# Patient Record
Sex: FEMALE | Race: WHITE | NOT HISPANIC OR LATINO | Employment: UNEMPLOYED | URBAN - METROPOLITAN AREA
[De-identification: names, ages, dates, MRNs, and addresses within clinical notes are randomized per-mention and may not be internally consistent; named-entity substitution may affect disease eponyms.]

---

## 2022-07-30 ENCOUNTER — TRANSFERRED RECORDS (OUTPATIENT)
Dept: MULTI SPECIALTY CLINIC | Facility: CLINIC | Age: 57
End: 2022-07-30

## 2024-01-17 ENCOUNTER — NURSE TRIAGE (OUTPATIENT)
Dept: NURSING | Facility: CLINIC | Age: 59
End: 2024-01-17
Payer: COMMERCIAL

## 2024-01-17 NOTE — TELEPHONE ENCOUNTER
Nurse Triage SBAR    Is this a 2nd Level Triage? NO    Situation: Frostbite concerns    Background: Pt reports she just moved back to the area and doesn't yet have a PCP established. Reports she was outside today with sock and boots on but she has concern she may have frostbite on a couple of her toes.     Assessment: Pt reports toes to feel hot and swollen. Reports they're pink/red in color and a little tender to the touch.    Protocol Recommended Disposition:   Home Care    Recommendation: Home care. Protocol and care advice reviewed. Advised to call back with any new or worsening signs, symptoms, concerns, or questions. They verbalized understanding and agreed to follow advice given.     Reason for Disposition   Mild frostbite or frostnip symptoms    Additional Information   Negative: Unconscious   Negative: Slurred speech   Negative: Confused thinking   Negative: Stumbling or falling   Negative: Sounds like a life-threatening emergency to the triager   Negative: Body temperature < 95 F (35 C) rectally or < 94 F (34.4 C) orally   Negative: Severe shivering that persists > 10 minutes after re-warming and drying   Negative: Before rewarming and skin is white, hard, completely numb (like a block of wood)   Negative: After hour of rewarming and skin looks blue-gray (cyanotic)   Negative: Frostbite blisters contain bloody fluid   Negative: Sounds like a serious injury to the triager   Negative: Looks infected (e.g., spreading redness, red streak, pus)   Negative: Unusually severe cold exposure and any other symptoms   Negative: Frostbite blisters contain clear fluid   Negative: Frostbite and no prior tetanus shots (or is not fully vaccinated)   Negative: Frostbite and HIV positive or severe immunodeficiency (severely weak immune system)   Negative: Patient wants to be seen   Negative: Frostbite and last tetanus shot > 5 years ago    Protocols used: Frostbite-A-[place]    Kirti Barraza RN on 1/17/2024 at 4:57 PM

## 2024-01-29 ENCOUNTER — OFFICE VISIT (OUTPATIENT)
Dept: FAMILY MEDICINE | Facility: CLINIC | Age: 59
End: 2024-01-29
Payer: COMMERCIAL

## 2024-01-29 ENCOUNTER — ORDERS ONLY (AUTO-RELEASED) (OUTPATIENT)
Dept: FAMILY MEDICINE | Facility: CLINIC | Age: 59
End: 2024-01-29

## 2024-01-29 VITALS
DIASTOLIC BLOOD PRESSURE: 77 MMHG | SYSTOLIC BLOOD PRESSURE: 116 MMHG | HEIGHT: 62 IN | BODY MASS INDEX: 21.98 KG/M2 | TEMPERATURE: 99.1 F | RESPIRATION RATE: 16 BRPM | OXYGEN SATURATION: 99 % | HEART RATE: 79 BPM | WEIGHT: 119.44 LBS

## 2024-01-29 DIAGNOSIS — Z12.31 ENCOUNTER FOR SCREENING MAMMOGRAM FOR BREAST CANCER: ICD-10-CM

## 2024-01-29 DIAGNOSIS — Z13.228 SCREENING FOR METABOLIC DISORDER: ICD-10-CM

## 2024-01-29 DIAGNOSIS — Z78.0 POST-MENOPAUSAL: ICD-10-CM

## 2024-01-29 DIAGNOSIS — Z13.220 SCREENING FOR LIPID DISORDERS: ICD-10-CM

## 2024-01-29 DIAGNOSIS — Z13.0 SCREENING FOR DEFICIENCY ANEMIA: ICD-10-CM

## 2024-01-29 DIAGNOSIS — L40.9 PSORIASIS: ICD-10-CM

## 2024-01-29 DIAGNOSIS — Z85.828 HISTORY OF BASAL CELL CANCER: ICD-10-CM

## 2024-01-29 DIAGNOSIS — F43.23 ADJUSTMENT DISORDER WITH MIXED ANXIETY AND DEPRESSED MOOD: ICD-10-CM

## 2024-01-29 DIAGNOSIS — Z12.11 SCREEN FOR COLON CANCER: ICD-10-CM

## 2024-01-29 DIAGNOSIS — F10.20 ALCOHOLISM (H): ICD-10-CM

## 2024-01-29 DIAGNOSIS — F17.200 TOBACCO USE DISORDER: ICD-10-CM

## 2024-01-29 DIAGNOSIS — G44.229 CHRONIC TENSION-TYPE HEADACHE, NOT INTRACTABLE: ICD-10-CM

## 2024-01-29 DIAGNOSIS — Z00.00 ROUTINE GENERAL MEDICAL EXAMINATION AT A HEALTH CARE FACILITY: Primary | ICD-10-CM

## 2024-01-29 LAB
ERYTHROCYTE [DISTWIDTH] IN BLOOD BY AUTOMATED COUNT: 13.3 % (ref 10–15)
HCT VFR BLD AUTO: 41.1 % (ref 35–47)
HGB BLD-MCNC: 13.7 G/DL (ref 11.7–15.7)
MCH RBC QN AUTO: 31.4 PG (ref 26.5–33)
MCHC RBC AUTO-ENTMCNC: 33.3 G/DL (ref 31.5–36.5)
MCV RBC AUTO: 94 FL (ref 78–100)
PLATELET # BLD AUTO: 304 10E3/UL (ref 150–450)
RBC # BLD AUTO: 4.37 10E6/UL (ref 3.8–5.2)
WBC # BLD AUTO: 7.3 10E3/UL (ref 4–11)

## 2024-01-29 PROCEDURE — 85027 COMPLETE CBC AUTOMATED: CPT

## 2024-01-29 PROCEDURE — 36415 COLL VENOUS BLD VENIPUNCTURE: CPT

## 2024-01-29 PROCEDURE — 80053 COMPREHEN METABOLIC PANEL: CPT

## 2024-01-29 PROCEDURE — 99386 PREV VISIT NEW AGE 40-64: CPT

## 2024-01-29 PROCEDURE — 99214 OFFICE O/P EST MOD 30 MIN: CPT | Mod: 25

## 2024-01-29 PROCEDURE — 80061 LIPID PANEL: CPT

## 2024-01-29 RX ORDER — PNV NO.95/FERROUS FUM/FOLIC AC 28MG-0.8MG
500 TABLET ORAL PRN
COMMUNITY

## 2024-01-29 RX ORDER — GABAPENTIN 100 MG/1
100 CAPSULE ORAL 3 TIMES DAILY
Qty: 270 CAPSULE | Refills: 3 | Status: SHIPPED | OUTPATIENT
Start: 2024-01-29 | End: 2024-04-01

## 2024-01-29 RX ORDER — OMEGA-3 FATTY ACIDS/FISH OIL 300-1000MG
400 CAPSULE ORAL EVERY 8 HOURS PRN
COMMUNITY

## 2024-01-29 RX ORDER — GABAPENTIN 100 MG/1
100 CAPSULE ORAL
COMMUNITY
Start: 2022-07-13 | End: 2024-01-29

## 2024-01-29 ASSESSMENT — ENCOUNTER SYMPTOMS
BREAST MASS: 0
PALPITATIONS: 0
NERVOUS/ANXIOUS: 1
CONSTIPATION: 0
HEADACHES: 1
JOINT SWELLING: 0
HEARTBURN: 0
HEMATOCHEZIA: 0
FREQUENCY: 0
NAUSEA: 0
WEAKNESS: 0
CHILLS: 0
EYE PAIN: 0
ABDOMINAL PAIN: 0
COUGH: 1
DYSURIA: 0
PARESTHESIAS: 0
SORE THROAT: 0
FEVER: 0
DIARRHEA: 0
ARTHRALGIAS: 0
DIZZINESS: 0
SHORTNESS OF BREATH: 0
HEMATURIA: 0
MYALGIAS: 1

## 2024-01-29 NOTE — ASSESSMENT & PLAN NOTE
Primarily triggered by stress. She reports that overall her symptoms are well controlled. She has used a steroid ointment in the past with good success.  Denies additional needs today; we discussed the use of emollient therapy with bursts of topical steroids only if needed.

## 2024-01-29 NOTE — ASSESSMENT & PLAN NOTE
Patient reports longstanding issues with alcoholism.  She notes a period of approximately 17 years of sobriety, but notes that the recent years have been more problematic.  She is currently sober has been for 9 months.  Congratulated her on this effort today.  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  She notes that the last year was quite intensive and her treatment and management of sobriety, including talk therapy.  She has participated medication assisted in abstinence historically but currently is not on any medications.  Again, congratulated her on her efforts and encouraged her to reach out with any additional needs.

## 2024-01-29 NOTE — ASSESSMENT & PLAN NOTE
Annual exam.  New patient.  Records reviewed today.  Pap: Appears patient is due.  I can see an HPV only with no Pap from 2020.  She declines this today.  Mammogram: Ordered.  Colon cancer screening: Patient reports that she has had a colonoscopy which was normal, and since then has elected for Cologuard.  She thinks it has been at least 3 years since her last Cologuard so order was placed today.  Immunizations: Interested in RSV, but will check with insurance.  Future ordered today.  Labs: Discussed and offered.  Mood: As documented.  BMI: 22.06.  Discussed diet and exercise.  Discussed bone health.  She is a chronic tobacco user, so early DEXA screening was ordered as we discussed this puts her at higher risk  Follow-up in 1 year for annual exam or sooner if needed/indicated.

## 2024-01-29 NOTE — COMMUNITY RESOURCES LIST (ENGLISH)
01/29/2024   [facility]  N/A  For questions about this resource list or additional care needs, please contact your primary care clinic or care manager.  Phone: [phone]   Email: N/A   Address: [address] [place] [id number]   Hours: N/A        Financial Stability       Rent and mortgage payment assistance  1  [facility] Distance: 4.46 miles      [address] [place] [address]  Language: English, Irish  Hours: Mon 9:30 AM - 11:30 AM , Tue 1:30 PM - 7:30 PM , Thu 9:00 AM - 7:00 PM , Fri 9:30 AM - 11:30 AM   Phone: (632) 595-3697 Email: info@[employer] Website: http://[employer].org/     2  [facility] - Homeless Outreach Services Team - Emergency Rental Assistance Distance: 8.54 miles      In-Person, Phone/[address] [address]  Language: English  Hours: Mon - Thu 8:00 AM - 4:00 PM  Fees: Free   Phone: (199) 713-8493 Email: office@[facility].[facility] Website: https://www.[facility].[facility]/community-resource-center/          Food and Nutrition       Food pantry  3  [facility] Distance: 3.17 miles      In-Person, Pickup   [address] [place] [address]  Language: English  Hours: Mon - Thu 9:00 AM - 10:00 AM  Fees: Free   Phone: (154) 725-5547 Email: communication@[facility].org Website: http://www.[facility].org     4  [facility] - Food Shelf Distance: 4.46 miles      Pickup   [address] [place] [address]  Language: English, Irish  Hours: Mon 9:30 AM - 11:30 AM , Wed 9:30 AM - 11:30 AM , Thu 1:30 PM - 6:30 PM , Fri 9:30 AM - 11:30 AM  Fees: Free   Phone: (314) 193-5334 Email: info@[employer] Website: http://[employer].org/     SNAP application assistance  5  [facility] - Economic Support [facility] Distance: 3.99 miles      In-Person, Phone/Virtual   [address] [place] [address]  Language: English   Hours: Mon - Fri 8:00 AM - 4:30 PM  Fees: Free   Phone: (456) 295-3173 Email: lanny@co.[facility]. Website: https://www.coNetroundsArrowhead Regional Medical CenterNetrounds/787/Economic-Support     6  [facility] - Economic Support [facility] Distance: 14.33 miles      In-Person, Phone/Virtual   [address] [place] [address]  Language: English  Hours: Mon - Fri 8:00 AM - 4:30 PM  Fees: Free   Phone: (718) 231-8884 Email: catherinetonio@co.[facility] Website: https://www.co.[facility] CenterNet[facility]/787/Economic-Support     Soup kitchen or free meals  7  [facility] Meal for Everyone (HOME) Distance: 7.88 miles      [address] [address]  Language: English  Hours: Thu 5:30 PM - 6:30 PM  Fees: Free   Phone: (485) 664-4665 Email: clifford@[employer] Website: http://[employer]/     8  [facility] - Homeless Outreach Services Team - Food Assistance Distance: 8.54 miles      [address] [address]  Language: English  Hours: Mon - Thu 9:30 AM - 3:30 PM  Fees: Free   Phone: (559) 543-6523 Email: office@[facility].[employer] Website: https://www.[facility].org/community-resource-center/          Important Numbers & Websites       Emergency Services   911  [facility] Services   311  Poison Control   (668) 194-7345  Suicide Prevention Lifeline   (190) 407-4378 (TALK)  Child Abuse Hotline   (998) 979-7030 (4-A-Child)  Sexual Assault Hotline   (974) 645-6320 (HOPE)  National Runaway Safeline   (814) 939-4909 (RUNAWAY)  All-Options Talkline   (455) 616-9399  Substance Abuse Referral   (391) 424-9465 (HELP)

## 2024-01-29 NOTE — ASSESSMENT & PLAN NOTE
Patient smokes about a pack a day currently. She has tried various cessation techniques in the past including NRT and coaching. She has tried Wellbutrin historically (side effects - 'crazy feeling') and Chantix (without success).  She is not interested in cessation today.  We reviewed lung cancer screening, and ultimately she defers this today but will consider next year.  I encouraged her to reach out with any additional needs.

## 2024-01-29 NOTE — ASSESSMENT & PLAN NOTE
Patient reports that her struggles with mental health also seem to correlate with her struggles with sobriety.  Right now, she feels that she is doing quite well.  She has a good support system.  She is not currently on any medication.  She does take gabapentin for chronic pain, but notes that she feels this also positively impacts her mental health.  She finds the most success in things such as meditation and other mind-body exercises.  She does note that she has been on traditional SSRI medications historically, including Lexapro and Prozac, but feels as if they clouded her mind.  She denies additional needs today and I encouraged her to reach out with any concerns.

## 2024-01-29 NOTE — ASSESSMENT & PLAN NOTE
Patient reports that this is a chronic concern.  She sees chiropractic care which is somewhat helpful. Is able to identify lifestyle triggers as well. Has been prescribed gabapentin historically which she also believes helps with both headaches and mental health. Refill on this medication was sent in today.

## 2024-01-29 NOTE — PROGRESS NOTES
Preventive Care Visit  [facility]  OVIDIO Alvarez CNP, Family Medicine  Jan 29, 2024       SUBJECTIVE:   Marilee is a 58 year old, presenting for the following:  Physical (Pt. Nonfasting. Last PAP unknown.) and Medication Update  Patient reports that she recently moved to [place] from [place].  She is originally from this area, and is quite happy with this move.  She denies any acute concerns related to her health.      1/29/2024    12:48 PM   Additional Questions   Roomed by sac   Accompanied by self         1/29/2024    12:52 PM   Patient Reported Additional Medications   Patient reports taking the following new medications no     Healthy Habits:     Getting at least 3 servings of Calcium per day:  Yes    Bi-annual eye exam:  Yes    Dental care twice a year:  NO    Sleep apnea or symptoms of sleep apnea:  Excessive snoring    Diet:  Regular (no restrictions)    Frequency of exercise:  2-3 days/week    Duration of exercise:  Less than 15 minutes    Taking medications regularly:  Yes    Medication side effects:  None    Additional concerns today:  Yes    Believes that her diet is well-rounded.  She is not exercising as much as she would like, but does try to make an effort to stay active a couple days a week.    Today's PHQ-2 Score:       1/29/2024    12:22 PM   PHQ-2 ( 1999 [employer])   Q1: Little interest or pleasure in doing things 0   Q2: Feeling down, depressed or hopeless 0   PHQ-2 Score 0   Q1: Little interest or pleasure in doing things Not at all   Q2: Feeling down, depressed or hopeless Not at all   PHQ-2 Score 0     Via the Health Maintenance questionnaire, the patient has reported the following services have been completed -Mammogram, this information has been sent to the abstraction team.    Social History     Tobacco Use     Smoking status: Every Day     Packs/day: 1.00     Years: 40.00     Additional pack years: 0.00     Total pack years: 40.00     Types: Cigarettes      Smokeless tobacco: Never   Substance Use Topics     Alcohol use: Not on file             1/29/2024    12:22 PM   Alcohol Use   Prescreen: >3 drinks/day or >7 drinks/week? Not Applicable     Reviewed orders with patient.  Reviewed health maintenance and updated orders accordingly - Yes  Lab work is in process  Labs reviewed in EPIC  BP Readings from Last 3 Encounters:   01/29/24 116/77    Wt Readings from Last 3 Encounters:   01/29/24 54.2 kg (119 lb 7 oz)                  Patient Active Problem List   Diagnosis     Adjustment disorder with mixed anxiety and depressed mood     Alcoholism (H)     Chronic tension-type headache, not intractable     History of basal cell cancer     Tobacco use disorder     Psoriasis     Routine general medical examination at a health care facility     History reviewed. No pertinent surgical history.    Social History     Tobacco Use     Smoking status: Every Day     Packs/day: 1.00     Years: 40.00     Additional pack years: 0.00     Total pack years: 40.00     Types: Cigarettes     Smokeless tobacco: Never   Substance Use Topics     Alcohol use: Not on file     Family History   Problem Relation Age of Onset     Alzheimer Disease Father      Heart Disease Father      Alzheimer Disease Sister 59     Diabetes Maternal Grandmother      Heart Failure Maternal Grandmother      Breast Cancer Paternal Grandmother 70 - 79     Colon Cancer No family hx of      Thyroid Cancer No family hx of          Current Outpatient Medications   Medication Sig Dispense Refill     gabapentin (NEURONTIN) 100 MG capsule Take 1 capsule (100 mg) by mouth 3 times daily 270 capsule 3     ibuprofen (ADVIL/MOTRIN) 200 MG capsule Take 400 mg by mouth every 8 hours as needed       melatonin 5 MG CAPS Take 10 mg by mouth       MULTIPLE VITAMIN PO Take 1 capsule by mouth daily       Magnesium Oxide 250 MG tablet Take 500 mg by mouth as needed (Patient not taking: Reported on 1/29/2024)       No Known Allergies  No lab  results found.     Breast Cancer Screening:    FHS-7:       1/29/2024    12:25 PM   Breast CA Risk Assessment (FHS-7)   Did any of your first-degree relatives have breast or ovarian cancer? Yes   Did any of your relatives have bilateral breast cancer? No   Did any man in your family have breast cancer? No   Did any woman in your family have breast and ovarian cancer? No   Did any woman in your family have breast cancer before age 50 y? No   Do you have 2 or more relatives with breast and/or ovarian cancer? No   Do you have 2 or more relatives with breast and/or bowel cancer? No     Mammogram Screening: Recommended mammography every 1-2 years with patient discussion and risk factor consideration  Pertinent mammograms are reviewed under the imaging tab.    History of abnormal Pap smear: NO - age 30-65 PAP every 5 years with negative HPV co-testing recommended     Reviewed and updated as needed this visit by clinical staff   Tobacco  Allergies  Meds  Problems  Med Hx  Surg Hx  Fam Hx          Reviewed and updated as needed this visit by Provider     Meds  Problems  Med Hx  Surg Hx  Fam Hx            Review of Systems   Constitutional:  Negative for chills and fever.   HENT:  Positive for congestion. Negative for ear pain, hearing loss and sore throat.    Eyes:  Negative for pain and visual disturbance.   Respiratory:  Positive for cough. Negative for shortness of breath.    Cardiovascular:  Negative for chest pain and palpitations.   Gastrointestinal:  Negative for abdominal pain, constipation, diarrhea and nausea.   Genitourinary:  Negative for dysuria, frequency, genital sores, hematuria, pelvic pain, urgency, vaginal bleeding and vaginal discharge.   Musculoskeletal:  Positive for myalgias. Negative for arthralgias and joint swelling.   Skin:  Negative for rash.   Neurological:  Positive for headaches. Negative for dizziness and weakness.   Psychiatric/Behavioral:  The patient is nervous/anxious.   "    OBJECTIVE:   /77 (BP Location: Left arm, Patient Position: Sitting, Cuff Size: Adult Regular)   Pulse 79   Temp 99.1  F (37.3  C) (Oral)   Resp 16   Ht 1.567 m (5' 1.7\")   Wt 54.2 kg (119 lb 7 oz)   SpO2 99%   BMI 22.06 kg/m     Estimated body mass index is 22.06 kg/m  as calculated from the following:    Height as of this encounter: 1.567 m (5' 1.7\").    Weight as of this encounter: 54.2 kg (119 lb 7 oz).  Physical Exam  GENERAL: alert and no distress  EYES: Eyes grossly normal to inspection, PERRL and conjunctivae and sclerae normal  HENT: ear canals and TM's normal, nose and mouth without ulcers or lesions  NECK: no adenopathy, no asymmetry, masses, or scars  RESP: lungs clear to auscultation - no rales, rhonchi or wheezes  BREAST: Declined by patient due to upcoming mammogram  CV: regular rate and rhythm, normal S1 S2, no S3 or S4, no murmur, click or rub, no peripheral edema  ABDOMEN: soft, nontender, no hepatosplenomegaly, no masses and bowel sounds normal  MS: no gross musculoskeletal defects noted, no edema  SKIN: no suspicious lesions or rashes  NEURO: Normal strength and tone, mentation intact and speech normal  PSYCH: mentation appears normal, affect normal/bright  LYMPH: no cervical, supraclavicular, axillary, or inguinal adenopathy    ASSESSMENT/PLAN:     Problem List Items Addressed This Visit       Adjustment disorder with mixed anxiety and depressed mood     Patient reports that her struggles with mental health also seem to correlate with her struggles with sobriety.  Right now, she feels that she is doing quite well.  She has a good support system.  She is not currently on any medication.  She does take gabapentin for chronic pain, but notes that she feels this also positively impacts her mental health.  She finds the most success in things such as meditation and other mind-body exercises.  She does note that she has been on traditional SSRI medications historically, including " Lexapro and Prozac, but feels as if they clouded her mind.  She denies additional needs today and I encouraged her to reach out with any concerns.         Alcoholism (H)     Patient reports longstanding issues with alcoholism.  She notes a period of approximately 17 years of sobriety, but notes that the recent years have been more problematic.  She is currently sober has been for 9 months.  Congratulated her on this effort today.  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  She notes that the last year was quite intensive and her treatment and management of sobriety, including talk therapy.  She has participated medication assisted in abstinence historically but currently is not on any medications.  Again, congratulated her on her efforts and encouraged her to reach out with any additional needs.           Chronic tension-type headache, not intractable     Patient reports that this is a chronic concern.  She sees chiropractic care which is somewhat helpful. Is able to identify lifestyle triggers as well. Has been prescribed gabapentin historically which she also believes helps with both headaches and mental health. Refill on this medication was sent in today.          Relevant Medications    ibuprofen (ADVIL/MOTRIN) 200 MG capsule    gabapentin (NEURONTIN) 100 MG capsule    History of basal cell cancer     Due for skin check. Referral to dermatology placed.          Tobacco use disorder     Patient smokes about a pack a day currently. She has tried various cessation techniques in the past including NRT and coaching. She has tried Wellbutrin historically (side effects - 'crazy feeling') and Chantix (without success).  She is not interested in cessation today.  We reviewed lung cancer screening, and ultimately she defers this today but will consider next year.  I encouraged her to reach out with any additional needs.          Psoriasis      Primarily triggered by stress. She reports that overall her symptoms are well controlled. She has used a steroid ointment in the past with good success.  Denies additional needs today; we discussed the use of emollient therapy with bursts of topical steroids only if needed.         Relevant Orders    Adult Dermatology  Referral    Routine general medical examination at a health care facility - Primary     Annual exam.  New patient.  Records reviewed today.  Pap: Appears patient is due.  I can see an HPV only with no Pap from 2020.  She declines this today.  Mammogram: Ordered.  Colon cancer screening: Patient reports that she has had a colonoscopy which was normal, and since then has elected for Cologuard.  She thinks it has been at least 3 years since her last Cologuard so order was placed today.  Immunizations: Interested in RSV, but will check with insurance.  Future ordered today.  Labs: Discussed and offered.  Mood: As documented.  BMI: 22.06.  Discussed diet and exercise.  Discussed bone health.  She is a chronic tobacco user, so early DEXA screening was ordered as we discussed this puts her at higher risk  Follow-up in 1 year for annual exam or sooner if needed/indicated.          Other Visit Diagnoses       Screen for colon cancer        Relevant Orders    COLOGUARD([employer])    Screening for lipid disorders        Relevant Orders    Lipid panel reflex to direct LDL Non-fasting    Encounter for screening mammogram for breast cancer        Relevant Orders    MA Screen Bilateral w/Thierry    Screening for metabolic disorder        Relevant Orders    Comprehensive metabolic panel (BMP + Alb, Alk Phos, ALT, AST, Total. Bili, TP)    Screening for deficiency anemia        Relevant Orders    CBC with platelets (Completed)    Post-menopausal        Relevant Orders    DX Hip/Pelvis/Spine          Patient has been advised of split billing requirements and indicates understanding:  Yes    Counseling  Reviewed preventive health counseling, as reflected in patient instructions       Regular exercise       Healthy diet/nutrition       Immunizations  Will return for RSV        Alcohol Use       Osteoporosis prevention/bone health       Colorectal Cancer Screening    She reports that she has been smoking cigarettes. She has a 40 pack-year smoking history. She has never used smokeless tobacco.  Nicotine/Tobacco Cessation Plan  Information offered: Patient not interested at this time    Signed Electronically by: OVIDIO Alvarez CNP  Answers submitted by the patient for this visit:  Annual Preventive Visit (Submitted on 1/29/2024)  Chief Complaint: Annual Exam:  Blood in stool: No  heartburn: No  peripheral edema: No  mood changes: No  Skin sensation changes: No  tenderness: No  breast mass: No  breast discharge: No

## 2024-01-30 LAB
ALBUMIN SERPL BCG-MCNC: 4.8 G/DL (ref 3.5–5.2)
ALP SERPL-CCNC: 88 U/L (ref 40–150)
ALT SERPL W P-5'-P-CCNC: 18 U/L (ref 0–50)
ANION GAP SERPL CALCULATED.3IONS-SCNC: 10 MMOL/L (ref 7–15)
AST SERPL W P-5'-P-CCNC: 20 U/L (ref 0–45)
BILIRUB SERPL-MCNC: <0.2 MG/DL
BUN SERPL-MCNC: 10 MG/DL (ref 8–23)
CALCIUM SERPL-MCNC: 9.7 MG/DL (ref 8.8–10.2)
CHLORIDE SERPL-SCNC: 102 MMOL/L (ref 98–107)
CHOLEST SERPL-MCNC: 245 MG/DL
CREAT SERPL-MCNC: 0.54 MG/DL (ref 0.51–0.95)
DEPRECATED HCO3 PLAS-SCNC: 27 MMOL/L (ref 22–29)
EGFRCR SERPLBLD CKD-EPI 2021: >90 ML/MIN/1.73M2
FASTING STATUS PATIENT QL REPORTED: NO
GLUCOSE SERPL-MCNC: 85 MG/DL (ref 70–99)
HDLC SERPL-MCNC: 51 MG/DL
LDLC SERPL CALC-MCNC: 167 MG/DL
NONHDLC SERPL-MCNC: 194 MG/DL
POTASSIUM SERPL-SCNC: 4.4 MMOL/L (ref 3.4–5.3)
PROT SERPL-MCNC: 7.2 G/DL (ref 6.4–8.3)
SODIUM SERPL-SCNC: 139 MMOL/L (ref 135–145)
TRIGL SERPL-MCNC: 134 MG/DL

## 2024-02-14 LAB — NONINV COLON CA DNA+OCC BLD SCRN STL QL: NEGATIVE

## 2024-02-26 ENCOUNTER — TELEPHONE (OUTPATIENT)
Dept: FAMILY MEDICINE | Facility: CLINIC | Age: 59
End: 2024-02-26

## 2024-02-26 NOTE — TELEPHONE ENCOUNTER
Patient Quality Outreach    Patient is due for the following:   Cervical Cancer Screening - PAP Needed    Next Steps:   No follow up needed at this time.    Type of outreach:    Sent [employer] message.      Questions for provider review:    None           Jacqueline Jacobs MA

## 2024-04-01 ENCOUNTER — MYC MEDICAL ADVICE (OUTPATIENT)
Dept: FAMILY MEDICINE | Facility: CLINIC | Age: 59
End: 2024-04-01
Payer: COMMERCIAL

## 2024-04-01 DIAGNOSIS — G44.229 CHRONIC TENSION-TYPE HEADACHE, NOT INTRACTABLE: ICD-10-CM

## 2024-04-01 RX ORDER — GABAPENTIN 300 MG/1
300 CAPSULE ORAL 3 TIMES DAILY
Qty: 270 CAPSULE | Refills: 2 | Status: SHIPPED | OUTPATIENT
Start: 2024-04-01

## 2024-06-20 ENCOUNTER — MYC REFILL (OUTPATIENT)
Dept: FAMILY MEDICINE | Facility: CLINIC | Age: 59
End: 2024-06-20
Payer: COMMERCIAL

## 2024-06-20 DIAGNOSIS — G44.229 CHRONIC TENSION-TYPE HEADACHE, NOT INTRACTABLE: ICD-10-CM

## 2024-06-20 RX ORDER — GABAPENTIN 300 MG/1
300 CAPSULE ORAL 3 TIMES DAILY
Qty: 270 CAPSULE | Refills: 2 | OUTPATIENT
Start: 2024-06-20

## 2024-07-07 ENCOUNTER — VIRTUAL VISIT (OUTPATIENT)
Dept: URGENT CARE | Facility: CLINIC | Age: 59
End: 2024-07-07
Payer: COMMERCIAL

## 2024-07-07 DIAGNOSIS — R05.1 ACUTE COUGH: Primary | ICD-10-CM

## 2024-07-07 DIAGNOSIS — U07.1 INFECTION DUE TO 2019 NOVEL CORONAVIRUS: ICD-10-CM

## 2024-07-07 PROCEDURE — 99203 OFFICE O/P NEW LOW 30 MIN: CPT | Mod: 95

## 2024-07-07 NOTE — PROGRESS NOTES
"The patient has been notified of following:     \"This telephone visit will be conducted via a call between you and your physician/provider. We have found that certain health care needs can be provided without the need for a physical exam.  This service lets us provide the care you need with a short phone conversation.  If a prescription is necessary we can send it directly to your pharmacy.  If lab work is needed we can place an order for that and you can then stop by our lab to have the test done at a later time.    Telephone visits are billed at different rates depending on your insurance coverage. During this emergency period, for some insurers they may be billed the same as an in-person visit.  Please reach out to your insurance provider with any questions.    If during the course of the call the physician/provider feels a telephone visit is not appropriate, you will not be charged for this service.\"    Patient has given verbal consent for Telephone visit?  Yes      How would you like to obtain your AVS? MyChart    Subjective   CC: Marilee Lindsay  is a 58 year old female who presents via phone visit today for the following health issues:   Chief Complaint   Patient presents with    Infection          COVID-19 Symptom Review  How many days ago did these symptoms start? 7/6    Are any of the following symptoms significant for you?  New or worsening difficulty breathing? No  Worsening cough? Yes, it's a dry cough.   Fever or chills? No  Headache: No  Sore throat: No  Chest pain: No  Diarrhea: No  Body aches? YES    What treatments has patient tried? Acetaminophen   Does patient live in a nursing home, group home, or shelter? No  Does patient have a way to get food/medications during quarantined? Yes, I have a friend or family member who can help me.              Current Outpatient Medications   Medication Sig Dispense Refill    nirmatrelvir and ritonavir (PAXLOVID) 300 mg/100 mg therapy pack Take 3 tablets by " mouth 2 times daily for 5 days (Take 2 Nirmatrelvir tablets and 1 Ritonavir tablet twice daily for 5 days) 30 tablet 0    gabapentin (NEURONTIN) 300 MG capsule Take 1 capsule (300 mg) by mouth 3 times daily 270 capsule 2    ibuprofen (ADVIL/MOTRIN) 200 MG capsule Take 400 mg by mouth every 8 hours as needed      Magnesium Oxide 250 MG tablet Take 500 mg by mouth as needed (Patient not taking: Reported on 1/29/2024)      melatonin 5 MG CAPS Take 10 mg by mouth      MULTIPLE VITAMIN PO Take 1 capsule by mouth daily             Marilee is a 58 year old female who presents for a billable video visit.    ASSESSMENT/PLAN:  Marilee was seen today for infection.    Diagnoses and all orders for this visit:    Acute cough    Infection due to 2019 novel coronavirus  -     nirmatrelvir and ritonavir (PAXLOVID) 300 mg/100 mg therapy pack; Take 3 tablets by mouth 2 times daily for 5 days (Take 2 Nirmatrelvir tablets and 1 Ritonavir tablet twice daily for 5 days)      Pt had positive covid test at home today. Discussed risk and benefit of covid treatment with paxlovid, patient would like to start medication. [facility], pt notified of red flag symptoms of when she should seek higher level of care. Pt verbalized understanding.     SUBJECTIVE:     Pt reports cough and body aches that started yesterday, covid positive test today.       ROS: Pertinent ROS neg other than the symptoms noted above in the HPI.     OBJECTIVE:  Vitals not done due to this being a virtual visit    GENERAL: healthy, alert and no distress  EYES: Eyes grossly normal to inspection,conjunctivae and sclerae normal  RESP: Able to speak in complete sentences, no audible wheeze or cough  SKIN: no suspicious lesions or rashes  NEURO: mentation intact and speech normal  PSYCH: mentation appears normal, affect normal/bright    Video-Visit Details    Type of service:  Video Visit  Video Start Time: 0930  Video End Time: 0941    Originating Location:  Home    Distant Location:  [facility]     Platform used for Video Visit: OVIDIO Haywood CNP

## 2024-07-07 NOTE — LETTER
July 7, 2024      Marilee Lindsay  [address]  [place] [address]        To Whom It May Concern:    Marilee Lindsay  was seen on 7/7.  Please excuse her  until her symptoms are improving and she is fever free for 24 hours due to positive covid test.        Sincerely,        [facility]

## 2024-07-07 NOTE — PATIENT INSTRUCTIONS
Coronavirus (COVID-19): Care Instructions  What is COVID-19?  COVID-19 is a disease caused by a type of coronavirus. This illness was first found in 2019 and has since spread worldwide (pandemic). Symptoms can range from mild, such as fever and body aches, to severe, including trouble breathing. COVID-19 can be deadly.  Coronaviruses are a large group of viruses. Some types cause the common cold. Others cause more serious illnesses like Middle East respiratory syndrome (MERS) and severe acute respiratory syndrome (SARS).  Follow-up care is a key part of your treatment and safety. Be sure to make and go to all appointments, and call your doctor if you are having problems. It's also a good idea to know your test results and keep a list of the medicines you take.  How can you self-isolate when you have COVID-19?  If you have COVID-19, there are things you can do to help avoid spreading the virus to others.  Stay home, and avoid contact with other people.  Limit contact with people in your home. If possible, stay in a separate bedroom and use a separate bathroom.  Wear a high-quality mask when you are around other people.  Improve airflow. If you have to spend time indoors with others, open windows and doors. Or you can use a fan to blow air away from people and out a window.  Avoid contact with pets and other animals.  Cover your mouth and nose with a tissue when you cough or sneeze. Then throw it in the trash right away.  Wash your hands often, especially after you cough or sneeze. Use soap and water, and scrub for at least 20 seconds. If soap and water aren't available, use an alcohol-based hand .  Don't share personal household items. These include bedding, towels, cups and glasses, and eating utensils.  Wash laundry in the warmest water allowed for the fabric type, and dry it completely. It's okay to wash other people's laundry with yours.  Clean and disinfect your home. Use household  and  disinfectant wipes or sprays.  Go to the CDC website at cdc.gov if you have questions.  When can you end self-isolation for COVID-19?  If you know or think that you have the virus, you will need to self-isolate. When you can be around other people you live with and leave home depends on whether you have symptoms. Important: Day 0 is the day your symptoms started or the day you tested positive. Day 1 is the day after your symptoms first started or your test was positive.  If you tested positive but had no symptoms, it's safe to end isolation at the end of Day 5. But if you start to have symptoms, follow the recommendations below, and count your first day of symptoms as Day 0.  If you have symptoms, when you can end isolation depends on how sick you were and your overall health. No matter what, you need to wait until your symptoms are getting better and you haven't had a fever for 24 hours while not taking medicines to lower the fever. Here's how long to isolate, based on your symptoms:  If you were only a little sick: (This means you might have felt really bad but had no shortness of breath and never needed to be in the hospital.) You can end isolation at the end of Day 5.  If you were more sick: (You had some shortness of breath or some trouble breathing but never needed to be in the hospital.) You can end isolation at the end of Day 10.  If you were very sick and needed to be in the hospital, or if you have a weakened immune system: You can end isolation at the end of Day 10 or later. Talk to your doctor to find out when it's safe to end isolation. You may need a viral test.  After you end isolation, if your symptoms come back or get worse: Restart your isolation at Day 0. Do this even if it happens after you took medicine for COVID.  Avoid travel and stay away from people at high risk for serious disease for at least 10 days.  Those who can't wear a mask because they are under 2 years old or have certain  "disabilities should isolate for at least 10 full days.  Call your doctor or seek care if you have questions about your symptoms or when to end isolation.  Go to the CDC website at cdc.gov if you have questions.  Where can you learn more?  Go to https://www.[employer].net/patiented  Enter C007 in the search box to learn more about \"Coronavirus (COVID-19): Care Instructions.\"  Current as of: February 28, 2024               Content Version: 14.0    9928-1262 [facility].   Care instructions adapted under license by your healthcare professional. If you have questions about a medical condition or this instruction, always ask your healthcare professional. [facility] disclaims any warranty or liability for your use of this information.      "

## 2024-10-11 ENCOUNTER — OFFICE VISIT (OUTPATIENT)
Dept: DERMATOLOGY | Facility: CLINIC | Age: 59
End: 2024-10-11
Payer: COMMERCIAL

## 2024-10-11 DIAGNOSIS — D18.01 CHERRY ANGIOMA: ICD-10-CM

## 2024-10-11 DIAGNOSIS — Z85.828 HISTORY OF BASAL CELL CARCINOMA (BCC): ICD-10-CM

## 2024-10-11 DIAGNOSIS — D48.9 NEOPLASM OF UNCERTAIN BEHAVIOR: ICD-10-CM

## 2024-10-11 DIAGNOSIS — D22.9 MULTIPLE BENIGN NEVI: ICD-10-CM

## 2024-10-11 DIAGNOSIS — Z12.83 ENCOUNTER FOR SCREENING FOR MALIGNANT NEOPLASM OF SKIN: ICD-10-CM

## 2024-10-11 DIAGNOSIS — L30.9 CHRONIC DERMATITIS OF HANDS: Primary | ICD-10-CM

## 2024-10-11 DIAGNOSIS — L82.1 SEBORRHEIC KERATOSES: ICD-10-CM

## 2024-10-11 DIAGNOSIS — L81.4 LENTIGINES: ICD-10-CM

## 2024-10-11 PROCEDURE — 99203 OFFICE O/P NEW LOW 30 MIN: CPT | Performed by: NURSE PRACTITIONER

## 2024-10-11 RX ORDER — CLOBETASOL PROPIONATE 0.5 MG/G
OINTMENT TOPICAL 2 TIMES DAILY
Qty: 60 G | Refills: 1 | Status: SHIPPED | OUTPATIENT
Start: 2024-10-11

## 2024-10-11 NOTE — LETTER
10/11/2024      Marilee Lindsay  [address] [place] [address]      Dear Colleague,    Thank you for referring your patient, Marilee Lindsay, to the [facility]. Please see a copy of my visit note below.    [facility] Dermatology Note  Encounter Date: Oct 11, 2024  Office Visit     Reviewed patients past medical history and pertinent chart review prior to patients visit today.     Dermatology Problem List:  NUB glabella  History of BCC, chest prior to 2007  Hand dermatitis, patient states was told via a [facility] visit over pandemic it was psoriasis. Clobetasol ointment PRN flares  Mother had skin cancer, unknown type    ____________________________________________    Assessment & Plan:     # History of BCC, Well healed scar without signs of recurrent malignancy.     # Neoplasm of uncertain behavior:  glabella  DDx includes dermatitis vs BCC. Shave biopsy today.  Discussed shave biopsy but patient has a gala tomorrow night to go to. She will do a trial of hydrocortisone 1% cream BID until follow up and recheck in 1 month for possible biopsy if not resolved.     # Hand dermatitis.  Currently well-controlled  -Given clobetasol 0.05% ointment to use BID until resolved and then BID PRN for flares.  Counseled about use and side effects of thinning of the skin, striae, erythema, and worsening of rash.   Not for use in places other than hands or feet.   -apply steroid ointment at night to rashy areas, and petroleum jelly to the rest of the hands. Cover with cotton gloves overnight during sleep.   -reapply another time during the day when patient can keep on for 1+ hours without washing hands  -wear gloves when washing dishes, using cleaning supplies, or when hands would otherwise be immersed in soapy water.   -each time patient washes hands they should apply a moisturizing cream immediately afterwards. Examples include Cetaphil cream, Cera Ve Cream or Vanicream.        # Benign skin findings including: seborrheic keratoses, cherry angioma, lentigines and benign nevi.   - No further intervention required. Patient to report changes.   - Patient reassured of the benign nature of these lesions.    #Signs and Symptoms of non-melanoma skin cancer and ABCDEs of melanoma reviewed with patient. Patient encouraged to perform monthly self skin exams and educated on how to perform them. UV precautions reviewed with patient. Patient was asked about new or changing moles/lesions on body.     #Reviewed Sunscreen: Apply 20 minutes prior to going outdoors and reapply every two hours, when wet or sweating. We recommend using an SPF 30 or higher, and to use one that is water resistant.       Follow-up:  1 years for follow up full body skin exam, prn for new or changing lesions or new concerns    Aide Molina CNP  Dermatology     ____________________________________________    CC: Skin Check (Full:  right ribcage and left hand. )    HPI:  Ms. Marilee Lindsay is a(n) 59 year old female who presents today as a new patient for a full body skin cancer screening. Patient has concerns today about a spot of concern on the glabella.  She says it gets a little flaky at times but always looks red.  She has tried some clobetasol on it and it does settle it down but never goes away.  It is asymptomatic.  She also has a history of psoriasis.  She was diagnosed with psoriasis over a telehealth visit for rash on her hands.  It stays only on her fingers and sometimes she can feel some deep bumps under the skin.  It gets itchy.  She also has some scaly brown bumps on the abdomen that are sometimes itchy..     Patient is otherwise feeling well, without additional skin concerns.     Physical Exam:  SKIN: Total skin excluding the genitalia areas was performed. The exam included the head/face, neck, both arms, chest, back, abdomen, both legs, digits, mons pubis, buttock and nails.   -Glabella, superficial  telangiectasias and an ill-defined pink macule  -No active rash on the hands, nails have no pitting or dimpling or abnormalities  -Well-healed scar on the chest with no signs of recurrent BCC  -several 1-2mm red dome shaped symmetric papules scattered on the trunk  -multiple tan/brown flat round macules and raised papules scattered throughout trunk, extremities and head. No worrisome features for malignancy noted on examination.  -scattered tan, homogenous macules scattered on sun exposed areas of trunk, extremities and face.   -scattered waxy, stuck on tan/brown papules and patches on the trunk, and extremities    - No other lesions of concern on areas examined.     Medications:  Current Outpatient Medications   Medication Sig Dispense Refill     gabapentin (NEURONTIN) 300 MG capsule Take 1 capsule (300 mg) by mouth 3 times daily 270 capsule 2     ibuprofen (ADVIL/MOTRIN) 200 MG capsule Take 400 mg by mouth every 8 hours as needed       melatonin 5 MG CAPS Take 10 mg by mouth       MULTIPLE VITAMIN PO Take 1 capsule by mouth daily       Magnesium Oxide 250 MG tablet Take 500 mg by mouth as needed (Patient not taking: Reported on 1/29/2024)       No current facility-administered medications for this visit.      Past Medical History:   Patient Active Problem List   Diagnosis     Adjustment disorder with mixed anxiety and depressed mood     Alcoholism (H)     Chronic tension-type headache, not intractable     History of basal cell cancer     Tobacco use disorder     Psoriasis     Routine general medical examination at a health care facility     Past Medical History:   Diagnosis Date     Basal cell carcinoma        OVIDIO Johnston CNP  [address] [place] [address] on close of this encounter.      Again, thank you for allowing me to participate in the care of your patient.        Sincerely,        OVIDIO Osullivan CNP

## 2024-10-11 NOTE — PROGRESS NOTES
[facility] Dermatology Note  Encounter Date: Oct 11, 2024  Office Visit     Reviewed patients past medical history and pertinent chart review prior to patients visit today.     Dermatology Problem List:  NUB glabella  History of BCC, chest prior to 2007  Hand dermatitis, patient states was told via a [facility] visit over pandemic it was psoriasis. Clobetasol ointment PRN flares  Mother had skin cancer, unknown type    ____________________________________________    Assessment & Plan:     # History of BCC, Well healed scar without signs of recurrent malignancy.     # Neoplasm of uncertain behavior:  glabella  DDx includes dermatitis vs BCC. Shave biopsy today.  Discussed shave biopsy but patient has a gala tomorrow night to go to. She will do a trial of hydrocortisone 1% cream BID until follow up and recheck in 1 month for possible biopsy if not resolved.     # Hand dermatitis.  Currently well-controlled  -Given clobetasol 0.05% ointment to use BID until resolved and then BID PRN for flares.  Counseled about use and side effects of thinning of the skin, striae, erythema, and worsening of rash.   Not for use in places other than hands or feet.   -apply steroid ointment at night to rashy areas, and petroleum jelly to the rest of the hands. Cover with cotton gloves overnight during sleep.   -reapply another time during the day when patient can keep on for 1+ hours without washing hands  -wear gloves when washing dishes, using cleaning supplies, or when hands would otherwise be immersed in soapy water.   -each time patient washes hands they should apply a moisturizing cream immediately afterwards. Examples include Cetaphil cream, Cera Ve Cream or Vanicream.       # Benign skin findings including: seborrheic keratoses, cherry angioma, lentigines and benign nevi.   - No further intervention required. Patient to report changes.   - Patient reassured of the benign nature of these lesions.    #Signs and  Symptoms of non-melanoma skin cancer and ABCDEs of melanoma reviewed with patient. Patient encouraged to perform monthly self skin exams and educated on how to perform them. UV precautions reviewed with patient. Patient was asked about new or changing moles/lesions on body.     #Reviewed Sunscreen: Apply 20 minutes prior to going outdoors and reapply every two hours, when wet or sweating. We recommend using an SPF 30 or higher, and to use one that is water resistant.       Follow-up:  1 years for follow up full body skin exam, prn for new or changing lesions or new concerns    Aide Molina, KATE  Dermatology     ____________________________________________    CC: Skin Check (Full:  right ribcage and left hand. )    HPI:  Ms. Marilee Lindsay is a(n) 59 year old female who presents today as a new patient for a full body skin cancer screening. Patient has concerns today about a spot of concern on the glabella.  She says it gets a little flaky at times but always looks red.  She has tried some clobetasol on it and it does settle it down but never goes away.  It is asymptomatic.  She also has a history of psoriasis.  She was diagnosed with psoriasis over a telehealth visit for rash on her hands.  It stays only on her fingers and sometimes she can feel some deep bumps under the skin.  It gets itchy.  She also has some scaly brown bumps on the abdomen that are sometimes itchy..     Patient is otherwise feeling well, without additional skin concerns.     Physical Exam:  SKIN: Total skin excluding the genitalia areas was performed. The exam included the head/face, neck, both arms, chest, back, abdomen, both legs, digits, mons pubis, buttock and nails.   -Glabella, superficial telangiectasias and an ill-defined pink macule  -No active rash on the hands, nails have no pitting or dimpling or abnormalities  -Well-healed scar on the chest with no signs of recurrent BCC  -several 1-2mm red dome shaped symmetric papules scattered on  the trunk  -multiple tan/brown flat round macules and raised papules scattered throughout trunk, extremities and head. No worrisome features for malignancy noted on examination.  -scattered tan, homogenous macules scattered on sun exposed areas of trunk, extremities and face.   -scattered waxy, stuck on tan/brown papules and patches on the trunk, and extremities    - No other lesions of concern on areas examined.     Medications:  Current Outpatient Medications   Medication Sig Dispense Refill    gabapentin (NEURONTIN) 300 MG capsule Take 1 capsule (300 mg) by mouth 3 times daily 270 capsule 2    ibuprofen (ADVIL/MOTRIN) 200 MG capsule Take 400 mg by mouth every 8 hours as needed      melatonin 5 MG CAPS Take 10 mg by mouth      MULTIPLE VITAMIN PO Take 1 capsule by mouth daily      Magnesium Oxide 250 MG tablet Take 500 mg by mouth as needed (Patient not taking: Reported on 1/29/2024)       No current facility-administered medications for this visit.      Past Medical History:   Patient Active Problem List   Diagnosis    Adjustment disorder with mixed anxiety and depressed mood    Alcoholism (H)    Chronic tension-type headache, not intractable    History of basal cell cancer    Tobacco use disorder    Psoriasis    Routine general medical examination at a health care facility     Past Medical History:   Diagnosis Date    Basal cell carcinoma        OVIDIO Johnston CNP  [address] [place] [address] on close of this encounter.

## 2024-10-11 NOTE — PROGRESS NOTES
[facility] Dermatology Note  Encounter Date: Oct 11, 2024  Office Visit     Reviewed patients past medical history and pertinent chart review prior to patients visit today.     Dermatology Problem List:  Psoriasis  Current treatments: ***  Previous treatments: ***    ____________________________________________    Assessment & Plan:     # Plaque psoriasis  Condition and treatment options including topical steroid and nonsteroidal medications, phototherapy, methotrexate, and Biologics.  Patient would like to be aggressive as this is affecting quality of life. Discussed that this is an autoimmune disease and will be an ongoing condition that does not have a cure but we have several treatments for management of the condition. Also discussed risk of associated psoriatic arthritis. ***Patient denies chronic joint pain or swelling that does not resolve throughout the day.     ***Patient would like to proceed with narrowband UVB phototherapy. I gave patient diagnosis and procedure codes to check insurance coverage before proceeding with making appointments.  I would like patient to be seen 3 times weekly if possible for treatments.  Phone number given to schedule appointments after checking insurance coverage.      ***-Given topical triamcinolone 0.1% ointment to be used twice daily to plaques on the trunk and extremities.  Decrease use as plaques improve and discontinue if they resolve.    ***-Given ketoconazole 2% shampoo and advised to use this daily alternating with Neutrogena T. Sal shampoo.  Leave the shampoos on the scalp for 3 to 5 minutes before rinsing.  Also given fluocinonide 0.05% solution to be used 1-2 times daily as needed to active plaques.  Decrease use as plaques improve and discontinue when they resolved.  Use on an as-needed basis.  I would continue the shampoos ongoing even if the plaques are under good control.    ***- Start Humira 40mg/0.8ml, initial loading dose of 80mg on day 1, then inject  40mg/0.8mL subcutaneously on day 8 and then inject 40mg/0.8mL SubQ every two weeks thereafter.  - Edu that patient may not see benefit from the medication for 3-6 mo. Edu on potential side effects of immunosuppression as well as injection site reactions, increased risk of infections, heart failure, and neutropenia.  - Safety Labs: CBC, CMP, TB Quantiferon Gold, HIV, Hepatitis B & C antibody drawn. ***Because patient has a history of smoking I would also like to have a chest x-ray completed prior to starting Humira.  Will have labs redrawn again in 3 months.      Aide Molina, CNP  Dermatology    _______________________________________    CC: No chief complaint on file.    HPI:  Ms. Marilee Lindsay is a(n) 59 year old female who presents today {kknew/return:024322} for Psoriasis. ***    Patient is otherwise feeling well, without additional skin concerns.    Patient ***denies swollen and painful joints.       Physical Exam:  SKIN: {kkSkinExam:199044}  - ***    - No other lesions of concern on areas examined.     Medications:  Current Outpatient Medications   Medication Sig Dispense Refill    gabapentin (NEURONTIN) 300 MG capsule Take 1 capsule (300 mg) by mouth 3 times daily 270 capsule 2    ibuprofen (ADVIL/MOTRIN) 200 MG capsule Take 400 mg by mouth every 8 hours as needed      Magnesium Oxide 250 MG tablet Take 500 mg by mouth as needed (Patient not taking: Reported on 1/29/2024)      melatonin 5 MG CAPS Take 10 mg by mouth      MULTIPLE VITAMIN PO Take 1 capsule by mouth daily       No current facility-administered medications for this visit.      Past Medical History:   Patient Active Problem List   Diagnosis    Adjustment disorder with mixed anxiety and depressed mood    Alcoholism (H)    Chronic tension-type headache, not intractable    History of basal cell cancer    Tobacco use disorder    Psoriasis    Routine general medical examination at a health care facility     No past medical history on file.    CC  Anai Vaughn, APRN CNP  [address] [place] [address] on close of this encounter.

## 2024-11-03 ENCOUNTER — HEALTH MAINTENANCE LETTER (OUTPATIENT)
Age: 59
End: 2024-11-03

## 2024-11-11 ENCOUNTER — MYC REFILL (OUTPATIENT)
Dept: FAMILY MEDICINE | Facility: CLINIC | Age: 59
End: 2024-11-11
Payer: COMMERCIAL

## 2024-11-11 DIAGNOSIS — G44.229 CHRONIC TENSION-TYPE HEADACHE, NOT INTRACTABLE: ICD-10-CM

## 2024-11-11 RX ORDER — GABAPENTIN 300 MG/1
300 CAPSULE ORAL 3 TIMES DAILY
Qty: 270 CAPSULE | Refills: 2 | OUTPATIENT
Start: 2024-11-11

## 2024-11-13 ENCOUNTER — MYC MEDICAL ADVICE (OUTPATIENT)
Dept: FAMILY MEDICINE | Facility: CLINIC | Age: 59
End: 2024-11-13
Payer: COMMERCIAL

## 2024-11-13 DIAGNOSIS — G44.229 CHRONIC TENSION-TYPE HEADACHE, NOT INTRACTABLE: Primary | ICD-10-CM

## 2024-11-13 DIAGNOSIS — F43.23 ADJUSTMENT DISORDER WITH MIXED ANXIETY AND DEPRESSED MOOD: ICD-10-CM

## 2024-11-13 RX ORDER — GABAPENTIN 400 MG/1
400 CAPSULE ORAL 3 TIMES DAILY
Qty: 270 CAPSULE | Refills: 0 | Status: SHIPPED | OUTPATIENT
Start: 2024-11-13 | End: 2025-02-11

## 2024-11-13 NOTE — TELEPHONE ENCOUNTER
See 4/1/24 Shivahart message and OV on 1/29/24 noted:  Chronic tension-type headache, not intractable        Patient reports that this is a chronic concern.  She sees chiropractic care which is somewhat helpful. Is able to identify lifestyle triggers as well. Has been prescribed gabapentin historically which she also believes helps with both headaches and mental health. Refill on this medication was sent in today.            Relevant Medications     ibuprofen (ADVIL/MOTRIN) 200 MG capsule     gabapentin (NEURONTIN) 100 MG capsule

## 2024-11-14 ENCOUNTER — OFFICE VISIT (OUTPATIENT)
Dept: DERMATOLOGY | Facility: CLINIC | Age: 59
End: 2024-11-14
Payer: COMMERCIAL

## 2024-11-14 DIAGNOSIS — I78.1 SPIDER ANGIOMA: ICD-10-CM

## 2024-11-14 DIAGNOSIS — B00.9 HSV (HERPES SIMPLEX VIRUS) INFECTION: Primary | ICD-10-CM

## 2024-11-14 RX ORDER — VALACYCLOVIR HYDROCHLORIDE 1 G/1
TABLET, FILM COATED ORAL
Qty: 12 TABLET | Refills: 3 | Status: SHIPPED | OUTPATIENT
Start: 2024-11-14

## 2024-11-14 NOTE — LETTER
11/14/2024      Marilee Lindsay  [address] [place] [address]      Dear Colleague,    Thank you for referring your patient, Marilee Lindsay, to the [facility]. Please see a copy of my visit note below.    [facility] Dermatology Note  Encounter Date: Nov 14, 2024  Office Visit     Reviewed patients past medical history and pertinent chart review prior to patients visit today.     Dermatology Problem List:  HSV left upper arm.  Given some Valtrex for outbreaks  History of BCC on the chest in 2007  Hand dermatitis, was told it was psoriasis in the past.  Has clobetasol ointment for flares    Mother had skin cancer, type unknown    ____________________________________________    Assessment & Plan:     # spider angioma with lateral overlying seborrheic keratoses on the glabella.  No worrisome signs for malignancy today.  Continue to monitor for any changes.  - If lesion grows, changes, becomes symptomatic, bleeds without trauma, or becomes concerning to patient for any reason I would like to see them back for follow up to recheck for signs of malignancy. I discussed this with patient and patient agrees.      # recurrent HSV. Discussed antiviral treatment with outbreaks. Patient is interested in trying antivirals. Advised to take valtrex 2,000 mg at first sign of an outbreak, and 2,000 mg 12 hours later. Take with each outbreak. Discussed side effects of medication. If patient has more frequent outbreaks we may discuss viral suppression treatment.  Keep the area covered as it is contagious when she has an outbreak.    Aide Molina, CNP  Dermatology    _______________________________________    CC: RECHECK (Spot on forehead & check psoriasis flare. )    HPI:  Ms. Marilee Lindsay is a(n) 59 year old female who presents today for follow-up  for recheck of a spot on the glabella that we looked at her last visit.  She put some hydrocortisone on it and all the flaking  went away but the redness is still present.  It is asymptomatic.  She also has a spot she like to show me on the left upper arm.  She has a recurrent spot that only shows a very infrequently but in the same area each time.  It is quite painful and she wonders if it is part of her psoriasis.  She has been using some out as an itch before she noticed the clobetasol ointment on it but it does not seem to be going away.  It has been about 1 week.  It started rash and then the rash came shortly thereafter.  It seemed to have some blisterlike bubbles on the top that have now ruptured and is crusty.    Patient is otherwise feeling well, without additional skin concerns.      Physical Exam:  SKIN: Focused examination of face and left upper arm was performed.  - left upper arm, erythematous oval shaped plaque with erosions and crust at surface  -Red 1 mm papule with telangiectasias extending from center, there is 1 very small area of tan pigmentation with what looks like comedo like openings suggestive of an early seborrheic keratosis at the right lateral edge    - No other lesions of concern on areas examined.     Medications:  Current Outpatient Medications   Medication Sig Dispense Refill     valACYclovir (VALTREX) 1000 mg tablet Take 2 tablets at first sign of cold sore, then take 2 tablets 12 hours later. Repeat with outbreaks 12 tablet 3     clobetasol (TEMOVATE) 0.05 % external ointment Apply topically 2 times daily. To rash on hands when flared only 60 g 1     gabapentin (NEURONTIN) 300 MG capsule Take 1 capsule (300 mg) by mouth 3 times daily 270 capsule 2     gabapentin (NEURONTIN) 400 MG capsule Take 1 capsule (400 mg) by mouth 3 times daily. 270 capsule 0     ibuprofen (ADVIL/MOTRIN) 200 MG capsule Take 400 mg by mouth every 8 hours as needed       Magnesium Oxide 250 MG tablet Take 500 mg by mouth as needed (Patient not taking: Reported on 1/29/2024)       melatonin 5 MG CAPS Take 10 mg by mouth       MULTIPLE  VITAMIN PO Take 1 capsule by mouth daily       No current facility-administered medications for this visit.      Past Medical History:   Patient Active Problem List   Diagnosis     Adjustment disorder with mixed anxiety and depressed mood     Alcoholism (H)     Chronic tension-type headache, not intractable     History of basal cell cancer     Tobacco use disorder     Psoriasis     Routine general medical examination at a health care facility     Past Medical History:   Diagnosis Date     Basal cell carcinoma        CC Referred Self, MD  No address on file on close of this encounter.       Again, thank you for allowing me to participate in the care of your patient.        Sincerely,        OVIDIO Osullivan CNP

## 2024-11-14 NOTE — PROGRESS NOTES
[facility] Dermatology Note  Encounter Date: Nov 14, 2024  Office Visit     Reviewed patients past medical history and pertinent chart review prior to patients visit today.     Dermatology Problem List:  HSV left upper arm.  Given some Valtrex for outbreaks  History of BCC on the chest in 2007  Hand dermatitis, was told it was psoriasis in the past.  Has clobetasol ointment for flares    Mother had skin cancer, type unknown    ____________________________________________    Assessment & Plan:     # spider angioma with lateral overlying seborrheic keratoses on the glabella.  No worrisome signs for malignancy today.  Continue to monitor for any changes.  - If lesion grows, changes, becomes symptomatic, bleeds without trauma, or becomes concerning to patient for any reason I would like to see them back for follow up to recheck for signs of malignancy. I discussed this with patient and patient agrees.      # recurrent HSV. Discussed antiviral treatment with outbreaks. Patient is interested in trying antivirals. Advised to take valtrex 2,000 mg at first sign of an outbreak, and 2,000 mg 12 hours later. Take with each outbreak. Discussed side effects of medication. If patient has more frequent outbreaks we may discuss viral suppression treatment.  Keep the area covered as it is contagious when she has an outbreak.    Aide Molina, CNP  Dermatology    _______________________________________    CC: RECHECK (Spot on forehead & check psoriasis flare. )    HPI:  Ms. Marilee Lindsay is a(n) 59 year old female who presents today for follow-up  for recheck of a spot on the glabella that we looked at her last visit.  She put some hydrocortisone on it and all the flaking went away but the redness is still present.  It is asymptomatic.  She also has a spot she like to show me on the left upper arm.  She has a recurrent spot that only shows a very infrequently but in the same area each time.  It is quite painful  and she wonders if it is part of her psoriasis.  She has been using some out as an itch before she noticed the clobetasol ointment on it but it does not seem to be going away.  It has been about 1 week.  It started rash and then the rash came shortly thereafter.  It seemed to have some blisterlike bubbles on the top that have now ruptured and is crusty.    Patient is otherwise feeling well, without additional skin concerns.      Physical Exam:  SKIN: Focused examination of face and left upper arm was performed.  - left upper arm, erythematous oval shaped plaque with erosions and crust at surface  -Red 1 mm papule with telangiectasias extending from center, there is 1 very small area of tan pigmentation with what looks like comedo like openings suggestive of an early seborrheic keratosis at the right lateral edge    - No other lesions of concern on areas examined.     Medications:  Current Outpatient Medications   Medication Sig Dispense Refill    valACYclovir (VALTREX) 1000 mg tablet Take 2 tablets at first sign of cold sore, then take 2 tablets 12 hours later. Repeat with outbreaks 12 tablet 3    clobetasol (TEMOVATE) 0.05 % external ointment Apply topically 2 times daily. To rash on hands when flared only 60 g 1    gabapentin (NEURONTIN) 300 MG capsule Take 1 capsule (300 mg) by mouth 3 times daily 270 capsule 2    gabapentin (NEURONTIN) 400 MG capsule Take 1 capsule (400 mg) by mouth 3 times daily. 270 capsule 0    ibuprofen (ADVIL/MOTRIN) 200 MG capsule Take 400 mg by mouth every 8 hours as needed      Magnesium Oxide 250 MG tablet Take 500 mg by mouth as needed (Patient not taking: Reported on 1/29/2024)      melatonin 5 MG CAPS Take 10 mg by mouth      MULTIPLE VITAMIN PO Take 1 capsule by mouth daily       No current facility-administered medications for this visit.      Past Medical History:   Patient Active Problem List   Diagnosis    Adjustment disorder with mixed anxiety and depressed mood    Alcoholism  (H)    Chronic tension-type headache, not intractable    History of basal cell cancer    Tobacco use disorder    Psoriasis    Routine general medical examination at a health care facility     Past Medical History:   Diagnosis Date    Basal cell carcinoma        CC Referred Self, MD  No address on file on close of this encounter.

## 2024-11-19 ENCOUNTER — MYC MEDICAL ADVICE (OUTPATIENT)
Dept: FAMILY MEDICINE | Facility: CLINIC | Age: 59
End: 2024-11-19
Payer: COMMERCIAL

## 2024-11-19 DIAGNOSIS — G47.33 OSA (OBSTRUCTIVE SLEEP APNEA): Primary | ICD-10-CM

## 2024-12-18 ENCOUNTER — MYC REFILL (OUTPATIENT)
Dept: FAMILY MEDICINE | Facility: CLINIC | Age: 59
End: 2024-12-18
Payer: COMMERCIAL

## 2024-12-18 DIAGNOSIS — G44.229 CHRONIC TENSION-TYPE HEADACHE, NOT INTRACTABLE: ICD-10-CM

## 2024-12-18 DIAGNOSIS — F43.23 ADJUSTMENT DISORDER WITH MIXED ANXIETY AND DEPRESSED MOOD: ICD-10-CM

## 2024-12-19 RX ORDER — GABAPENTIN 400 MG/1
400 CAPSULE ORAL 3 TIMES DAILY
Qty: 270 CAPSULE | Refills: 0 | Status: SHIPPED | OUTPATIENT
Start: 2024-12-19

## 2025-01-26 ENCOUNTER — PATIENT OUTREACH (OUTPATIENT)
Dept: CARE COORDINATION | Facility: CLINIC | Age: 60
End: 2025-01-26
Payer: COMMERCIAL

## 2025-01-31 PROBLEM — F10.220: Status: ACTIVE | Noted: 2025-01-31

## 2025-01-31 PROBLEM — F10.21 ALCOHOL USE DISORDER, SEVERE, IN EARLY REMISSION (H): Status: ACTIVE | Noted: 2025-01-31

## 2025-02-02 ENCOUNTER — MYC MEDICAL ADVICE (OUTPATIENT)
Dept: FAMILY MEDICINE | Facility: CLINIC | Age: 60
End: 2025-02-02
Payer: COMMERCIAL

## 2025-02-03 ENCOUNTER — PATIENT OUTREACH (OUTPATIENT)
Dept: CARE COORDINATION | Facility: CLINIC | Age: 60
End: 2025-02-03
Payer: COMMERCIAL

## 2025-03-10 ENCOUNTER — ANCILLARY PROCEDURE (OUTPATIENT)
Dept: MAMMOGRAPHY | Facility: CLINIC | Age: 60
End: 2025-03-10
Payer: COMMERCIAL

## 2025-03-10 DIAGNOSIS — Z12.31 VISIT FOR SCREENING MAMMOGRAM: ICD-10-CM

## 2025-03-10 PROCEDURE — 77067 SCR MAMMO BI INCL CAD: CPT | Mod: TC | Performed by: RADIOLOGY

## 2025-03-10 PROCEDURE — 77063 BREAST TOMOSYNTHESIS BI: CPT | Mod: TC | Performed by: RADIOLOGY

## 2025-03-24 ENCOUNTER — ANCILLARY ORDERS (OUTPATIENT)
Dept: MAMMOGRAPHY | Facility: CLINIC | Age: 60
End: 2025-03-24
Payer: COMMERCIAL

## 2025-03-24 DIAGNOSIS — R92.8 ABNORMAL MAMMOGRAM: Primary | ICD-10-CM

## 2025-03-26 ENCOUNTER — ANCILLARY ORDERS (OUTPATIENT)
Dept: RADIOLOGY | Facility: CLINIC | Age: 60
End: 2025-03-26
Payer: COMMERCIAL

## 2025-05-01 ENCOUNTER — ANCILLARY PROCEDURE (OUTPATIENT)
Dept: MAMMOGRAPHY | Facility: CLINIC | Age: 60
End: 2025-05-01
Payer: COMMERCIAL

## 2025-05-01 DIAGNOSIS — R92.8 ABNORMAL MAMMOGRAM: ICD-10-CM

## 2025-05-01 PROCEDURE — G0279 TOMOSYNTHESIS, MAMMO: HCPCS

## 2025-05-01 PROCEDURE — 76642 ULTRASOUND BREAST LIMITED: CPT | Mod: 50

## 2025-06-09 ASSESSMENT — SLEEP AND FATIGUE QUESTIONNAIRES
HOW LIKELY ARE YOU TO NOD OFF OR FALL ASLEEP WHEN YOU ARE A PASSENGER IN A CAR FOR AN HOUR WITHOUT A BREAK: WOULD NEVER DOZE
HOW LIKELY ARE YOU TO NOD OFF OR FALL ASLEEP WHILE WATCHING TV: MODERATE CHANCE OF DOZING
HOW LIKELY ARE YOU TO NOD OFF OR FALL ASLEEP WHILE SITTING AND TALKING TO SOMEONE: WOULD NEVER DOZE
HOW LIKELY ARE YOU TO NOD OFF OR FALL ASLEEP WHILE LYING DOWN TO REST IN THE AFTERNOON WHEN CIRCUMSTANCES PERMIT: SLIGHT CHANCE OF DOZING
HOW LIKELY ARE YOU TO NOD OFF OR FALL ASLEEP WHILE SITTING AND READING: SLIGHT CHANCE OF DOZING
HOW LIKELY ARE YOU TO NOD OFF OR FALL ASLEEP WHILE SITTING INACTIVE IN A PUBLIC PLACE: WOULD NEVER DOZE
HOW LIKELY ARE YOU TO NOD OFF OR FALL ASLEEP IN A CAR, WHILE STOPPED FOR A FEW MINUTES IN TRAFFIC: WOULD NEVER DOZE
HOW LIKELY ARE YOU TO NOD OFF OR FALL ASLEEP WHILE SITTING QUIETLY AFTER LUNCH WITHOUT ALCOHOL: WOULD NEVER DOZE

## 2025-06-10 ENCOUNTER — VIRTUAL VISIT (OUTPATIENT)
Dept: SLEEP MEDICINE | Facility: CLINIC | Age: 60
End: 2025-06-10
Payer: COMMERCIAL

## 2025-06-10 VITALS — BODY MASS INDEX: 23.03 KG/M2 | HEIGHT: 61 IN | WEIGHT: 122 LBS

## 2025-06-10 DIAGNOSIS — F43.23 ADJUSTMENT DISORDER WITH MIXED ANXIETY AND DEPRESSED MOOD: ICD-10-CM

## 2025-06-10 DIAGNOSIS — F17.200 TOBACCO USE DISORDER: ICD-10-CM

## 2025-06-10 DIAGNOSIS — F10.21 ALCOHOL USE DISORDER, SEVERE, IN EARLY REMISSION (H): ICD-10-CM

## 2025-06-10 DIAGNOSIS — G47.33 OSA (OBSTRUCTIVE SLEEP APNEA): Primary | ICD-10-CM

## 2025-06-10 PROBLEM — M99.01 SEGMENTAL DYSFUNCTION OF CERVICAL REGION: Status: ACTIVE | Noted: 2024-09-23

## 2025-06-10 PROBLEM — M99.02 SEGMENTAL DYSFUNCTION OF THORACIC REGION: Status: ACTIVE | Noted: 2024-09-23

## 2025-06-10 PROBLEM — M99.03 SEGMENTAL DYSFUNCTION OF LUMBAR REGION: Status: ACTIVE | Noted: 2024-09-23

## 2025-06-10 PROCEDURE — 98001 SYNCH AUDIO-VIDEO NEW LOW 30: CPT | Performed by: NURSE PRACTITIONER

## 2025-06-10 PROCEDURE — 1125F AMNT PAIN NOTED PAIN PRSNT: CPT | Mod: 95 | Performed by: NURSE PRACTITIONER

## 2025-06-10 RX ORDER — HYDROXYZINE HYDROCHLORIDE 25 MG/1
TABLET, FILM COATED ORAL
COMMUNITY
Start: 2025-06-03

## 2025-06-10 ASSESSMENT — PAIN SCALES - GENERAL: PAINLEVEL_OUTOF10: MODERATE PAIN (5)

## 2025-06-10 NOTE — NURSING NOTE
Current patient location: [address] [place] [address]    Is the patient currently in the state of [place]? YES    Visit mode: VIDEO    If the visit is dropped, the patient can be reconnected by:VIDEO VISIT: Send to e-mail at: [email or web address]    Will anyone else be joining the visit? NO  (If patient encounters technical issues they should call [phone]    Are changes needed to the allergy or medication list? No    Are refills needed on medications prescribed by this physician? NO    Rooming Documentation:  Questionnaire(s) completed    Reason for visit: Consult    Angeline AZIDI

## 2025-06-10 NOTE — PATIENT INSTRUCTIONS
MY TREATMENT INFORMATION FOR SLEEP APNEA-  Marilee Lindsay    DOCTOR : OVIDIO Us CNP        Oral Appliance  What is oral appliance therapy?  An oral appliance device fits on your teeth at night like a retainer used after having braces. The device is made by a specialized dentist and requires several visits over 1-2 months before a manufactured device is made to fit your teeth and is adjusted to prevent your sleep apnea. Once an oral device is working properly, snoring should be improved. A home sleep test may be recommended at that time if to determine whether the sleep apnea is adequately treated.       Some things to remember:  -Oral devices are often, but not always, covered by your medical insurance. Be sure to check with your insurance provider.   -If you are referred for oral therapy, you will be given a list of specialized dentists to consider or you may choose to visit the Web site of the American Academy of Dental Sleep Medicine  -Oral devices are less likely to work if you have severe sleep apnea or are extremely overweight.     More detailed information  An oral appliance is a small acrylic device that fits over the upper and lower teeth  (similar to a retainer or a mouth guard). This device slightly moves jaw forward, which moves the base of the tongue forward, opens the airway, improves breathing for effective treat snoring and obstructive sleep apnea in perhaps 7 out of 10 people .  The best working devices are custom-made by a dental device  after a mold is made of the teeth 1, 2, 3.  When is an oral appliance indicated?  Oral appliance therapy is recommended as a first-line treatment for patients with primary snoring, mild sleep apnea, and for patients with moderate sleep apnea who prefer appliance therapy to use of CPAP4, 5. Severity of sleep apnea is determined by sleep testing and is based on the number of respiratory events per hour of sleep.   How  successful is oral appliance therapy?  The success rate of oral appliance therapy in patients with mild sleep apnea is 75-80% while in patients with moderate sleep apnea it is 50-70%. The chance of success in patients with severe sleep apnea is 40-50%. The research also shows that oral appliances have a beneficial effect on the cardiovascular health of CECILIA patients at the same magnitude as CPAP therapy7.  Oral appliances should be a second-line treatment in cases of severe sleep apnea, but if not completely successful then a combination therapy utilizing CPAP plus oral appliance therapy may be effective. Oral appliances tend to be effective in a broad range of patients although studies show that the patients who have the highest success are females, younger patients, those with milder disease, and less severe obesity. 3, 6.   Finding a dentist that practices dental sleep medicine  Specific training is available through the American Academy of Dental Sleep Medicine for dentists interested in working in the field of sleep. To find a dentist who is educated in the field of sleep and the use of oral appliances, near you, visit the Web site of the American Academy of Dental Sleep Medicine.    References  1. Arnie et al. Objectively measured vs self-reported compliance during oral appliance therapy for sleep-disordered breathing. Chest 2013; 144(5): 5928-6567.  2. Migue et al. Objective measurement of compliance during oral appliance therapy for sleep-disordered breathing. Thorax 2013; 68(1): 91-96.  3. Gail, et al. Mandibular advancement devices in 620 men and women with CECILIA and snoring: tolerability and predictors of treatment success. Chest 2004; 125: 9177-4772.  4. Tammi, et al. Oral appliances for snoring and CECILIA: a review. Sleep 2006; 29: 244-262.  5. Osvaldo, et al. Oral appliance treatment for CECILIA: an update. J Clin Sleep Med 2014; 10(2): 215-227.  6. Sammy et al. Predictors of OSAH  treatment outcome. J Dent Res 2007; 86: 7248-4725.    [facility]  Search engine: https://mms.aadsm.org/members/directory/search_bootstrap.php?org_id=ADSM&   Certified in Dental Sleep Medicine    Esau Holderjennifer  Degree: DDS  [address]  [place] [address]  Professional Phone: [phone]  Website: [email or web address]    Claude Vivar  Degree: DDS  [facility]  [address]  [address]  [place] [address]  Professional Phone: [phone]: [phone]    [facility]  [address] [place] [address]  [address]  [place] [address]  Appointments: [phone]  Fax: [phone]    Genet Campuzano  [facility]  [address]  [place] [address]  Professional Phone: [phone]  Website: [email or web address]      Glen Tam   [facility] [employer]   Degree: MD DAVID   [address]  [address]  [place] [address]  Appointments: [phone]    Donnie Zhong  Degree: DDS  [address]  [address]  [place] [address]  Professional Phone: [phone]  Fax: [phone]    Jose R Minaya  Degree: DDS  [facility]  [address]  [address]  [place] [address]  Professional Phone: [phone]  Website: [email or web address]      Hollie Manriquez  [facility]-you should verify insurance coverage  [address]  [address]  [place] [address]  Professional Phone: [phone]  Website: [email or web address]      Tammie Duckworth--DOES NOT ACCEPT INSURANCE  Degree: DAVID--you should verify insurance coverage  [facility]  [address]  [address]  [place] [address]  Professional Phone: [phone]    Pamela Quintanilla  Degree: DDS, PhD  [facility]  [address] [place] [address]    Appointments: [phone]   Fax: [phone]     Dillon Laraswell- [facility]  Degree: DANIELS  [address] [place] [address]  Professional Phone: [phone]  Fax: [phone]  Website: [email or web address]      Yang Mcdowell  Degree: DDS  [facility]  [address] [place] [address]    Mariona Mulet Pradera  Degree: MS DAVID  [facility]  [address] [place] [address]  Professional Phone: [phone]      Mariana Lee  Degree: MS DAVID  [facility]  [address]  [address]  [place] [address]  Professional Phone: [phone]    Aga Ge  Degree: DAVID  [facility]  [address]  [place] [address]  Appointments: [phone]    Vladimir Mccauley  Degree: DAVID  [facility]  [address] [place] [address]  Professional Phone: [phone]  Website: [email or web address]      Ramos Trinh  Degree: DANIELS  [facility] [place]  [address] [place] [address]  Professional Phone: [phone]  Fax: [phone]      Jose De Jesus Jerez  Degree: DAVID  [facility]  [address]  [address]  [place] [address]    Michael Galvan   Degree: DDS   [facility]   [address]   [address]  [place] [address]  Phone [phone]  Website: [email or web address]    Basilia Alatorre   Degree: DAVID   [address]  [address]  [place] [address]  Appointments: [phone]    Meg Garcia   Degree: DAVID   [address] [place] [address]   Appointments: [phone]    Erik Lopez   Degree: DAVID   [address] [place] [address]   Appointments: [phone]    Carl Garner   Degree: DAVID   [address] [place] [address]   Appointments: [phone]    Jon Cary   Degree: DAVID [facility], [address] [address]   Appointments:  [phone]    Florinda Magdaleno   Degree: DDS  [address] [place] [address]  Appointments: [phone]    Andrew Carrell   [address] [place] [address]  Appointments: [phone]    Noy Ch   Degree: DDS   [facility] [address] [address]   Appointments: [phone]    Skyler Pearson   Degree: DDS   [address] [place] [address]   Appointments: [phone]    [facility]    Degree: DDS  [address]  [address]  [place] [address]  Appointments: [phone]    Santos Paez   Degree: DDS   [facility]   [address]  [address]  [place] [address]  Appointment: [phone]    Joelle Girl   Degree: MS YOLI, FAAOP   [facility]  [address] [place] [address]  [address]  [place] [address]  Appointments: [phone]    Glen Pruitt   Degree: DDS   [address] [place] [address]   Appointments: [phone]   Fax: [phone]        ACCEPT MEDICARE  Chandler Washington DDS  [address], [place] [address]  [phone]; [phone] (fax)  [employer]    Deshawn Ley DDS, MS   [facility] [address]   [address]   [place] [address]   Appointments: [phone]   Fax: [phone]     Naresh Chadwick   Degree: DMD, MSD   [facility]   [address]  [address]  [place] [address]  Appointments: [phone]  Fax: [phone]    Sloane Daniels BDS, MS(CR). MS (OFP)  Diplomate American Board of Orofacial Pain  [facility]  [address],   [place] [address]  Appointment: [phone]  Fax: [phone]        ADDITIONAL PROVIDERS    Marvin Bansal DDS    [facility]   [facility]  [address] [address]  Appointments: [phone]     [facility]  [address] [place] [address]  [address]  [place] [address]  Appointments: [phone]  Fax- [phone]    Ines Cortez   Degree: DDS   [facility]    [address]  [address]  [place] [address]  Appointments: [phone]         Weight Loss:   Your Body mass index is 23.05 kg/m .    Being overweight does not necessarily mean you will have health consequences.  Those who have BMI over 30 or over 27 with existing medical conditions carries greater risk.   Weight loss decreases severity of sleep apnea in most people with obesity. For those with mild obesity who have developed snoring with weight gain, even 15-30 pound weight loss can improve and occasionally milder eliminate sleep apnea.  Structured and life-long dietary and health habits are necessary to lose weight and keep healthier weight levels.     The Comprehensive Weight loss program offers all aspects of weight loss strategies including two Non-Surgical Weight Loss Programs: Medical Weight Management and our 24 Week Healthy Lifestyle Program:  Medical Weight Management: You will meet with a Medical Weight Management Provider, as well as a Registered Dietician. The program may include medication therapy, dietary education, recommended exercise and physical therapy programs, monthly support group meetings, and possible psychological counseling. Follow up visits with the provider or dietician are scheduled based on your progress and needs.  24 Week Healthy Lifestyle Program: This unique program is designed to give you the support of weekly appointments and activities thru a 24-week period. It may include all of the components of the basic program (above), with the addition of 11 individual Health  Visits, 24-week access to the [employer] website for over 700 online classes, and monthly support group meetings. This program has an out-of-pocket expense of $499 to cover the items that can not be billed to insurance (health coaches and [employer] access), and is  non-refundable/non-transferable (you may be able to use a Health Savings Account; ask your HSA provider). There may be an optional meal replacement plan prescribed as well.   Medication therapy has been approved for the treatment of sleep apnea: The FDA approved tirzepatide (ZEPBOUND) for moderate to severe sleep apnea (apnea-hypopnea index greater than or equal to 15) in patients with BMI of greater than or equal to 30, or BMI greater than or equal to 27 with at least 1 weight-related condition such as hypertension or dyslipidemia.  Surgical management achieves meaningful long-term weight loss and improvement in health risks in most patients with more severe obesity.    Drive Safe... Drive Alive     Sleep health profoundly affects your health, mood, and your safety. 33% of the population (one in three of us) is not getting enough sleep and many have a sleep disorder. Not getting enough sleep or having an untreated / undertreated sleep condition may make us sleepy without even knowing it. In fact, our driving could be dramatically impaired due to our sleep health. As your provider, here are some things I would like you to know about driving:     Here are some warning signs for impairment and dangerous drowsy driving:              -Having been awake more than 16 hours               -Looking tired               -Eyelid drooping              -Head nodding (it could be too late at this point)              -Driving for more than 30 minutes     Some things you could do to make the driving safer if you are experiencing some drowsiness:              -Stop driving and rest              -Call for transportation              -Make sure your sleep disorder is adequately treated     Some things that have been shown NOT to work when experiencing drowsiness while driving:              -Turning on the radio              -Opening windows              -Eating any  distracting  /  entertaining  foods (e.g., sunflower seeds, candy, or  any other)              -Talking on the phone      Your decision may not only impact your life, but also the life of others. Please, remember to drive safe for yourself and all of us.

## 2025-06-10 NOTE — PROGRESS NOTES
Virtual Visit Details    Type of service:  Video Visit   Video Start Time: 9:03 AM  Video End Time:9:44 AM    Originating Location (pt. Location): Home    Distant Location (provider location):  Off-site  Platform used for Video Visit: [facility]    Outpatient Sleep Medicine Consultation:      Name: Marilee Lindsay MRN# [id number]   Age: 59 year old YOB: 1965     Date of Consultation: June 9, 2025  Consultation is requested by: Referred Self, MD  No address on file Referred Self  Primary care provider: Anai Vaughn       Reason for Sleep Consult:     Marilee Lindsay is sent by Referred Self for a sleep consultation   Patient s Reason for visit  Marilee Lindsay main reason for visit: (Patient-Rptd) Previously diagnosed with Obstructive Sleep Apnea  Patient states problem(s) started: (Patient-Rptd) 10 years ago or so?  Marilee Lindsay's goals for this visit: (Patient-Rptd) See if sleep study is warranted and learn about alternatives to CPAP           Assessment and Plan:     Impression/Plan:    ICD-10-CM    1. CECILIA (obstructive sleep apnea)  G47.33 Adult Sleep Eval & Management  Referral     Sleep Dental Referral      2. Alcohol use disorder, severe, in early remission (H)  F10.21 Sleep Dental Referral      3. Tobacco use disorder  F17.200 Sleep Dental Referral      4. Adjustment disorder with mixed anxiety and depressed mood  F43.23 Sleep Dental Referral        Plans for Marilee Lindsay include:  Referral to sleep dental provider.Marilee Lindsay presently has untreated sleep apnea.  She has attempted use with CPAP therapy and that has proven to be a failed option for her.  We have discussed an option of being referred to a sleep dental provider, and she seems enthusiastic about that.  Referral was made, sent to her through her [employer], options for sleep dental providers were sent with the referral as well as in her after visit summary.  Marilee Lindsay may reach out at any  "time if she has any questions or concerns regarding her care plan of care.  Patient instructed to follow-up at completion of her dental device so we may complete a home sleep test, this time with the device in place to assess efficacy in the treatment of her obstructive sleep apnea.  - In addition, recommend patient optimize their sleep schedule as well as sleep hygiene practices to mitigate any further sleep disruption.  - Recommend they employ safe driving practices such as not driving motor vehicle should they become drowsy.  - Recommend weight management to a BMI of 30.0 or less.    41 minutes spent with patient, all of which were spent face-to-face counseling, consulting, coordinating plan of care.  The longitudinal plan of care for the diagnosis(es)/condition(s) as documented were addressed during this visit. Due to the added complexity in care, I will continue to support Marilee in the subsequent management and with ongoing continuity of care.    OVIDIO Us CNP         History of Present Illness:     Marilee Lindsay presents to the sleep medicine clinic with concerns of discussing alternative therapies to CPAP for treatment of obstructive sleep apnea.    Marilee Lindsay has a medical history which includes adjustment disorder with mixed anxiety and depressed mood, chronic tension headaches, tobacco use disorder, psoriasis, severe alcohol use disorder/in early remission, lumbar, thoracic, and cervical segmental dysfunction.    \"Recessed jaw\" per patient.  Slight retrognathia noted    1 pack/day smoker cigarettes, 40-pack-year history    Takes melatonin 10 mg daily to achieve sleep    Has 1-2 awakenings per night for snort arousals, anxiety, and elimination needs.    Without therapy, patient suffers from socially disruptive snoring, snort arousals, witnessed apneas, morning headaches, morning mouth dryness, bruxism, shortness of breath with activity morning headaches, and anxiety.    Marilee " LATISHA Lindsay and I briefly reviewed her initial sleep study from 2023 identifying her sleep apnea.  She would like to begin treating it, she has failed CPAP therapy, and will receive a referral to a sleep dental provider for consideration of a mandibular advancement device.  We reviewed specifics about the device, through shared screen and information was placed in her after visit summary.    Gabapentin 600 mg for chronic tension type headaches.  Also use for adjustment disorder with mixed anxiety/depressed mood.    Father, and sister both with Alzheimer's disease.    A friend recently talked with me about how much I snore at night and sometimes stop breathing. I had a sleep study in 2023 at [facility] in [facility] and was diagnosed with Obstructive Sleep Apnea. I got a CPAP but my cat ept going after the hose so I never wore it!     I had to return the device when I moved to [place] and hadn't thought about it until now. I am not sure how it would work for me to get a CPAP. I could call the clinic in [place] and ask that they transfer my records. Or may need to do another sleep study here?    [place] Sleepiness Scale  Total score - [place]:4   (Less than 10 normal)     Insomnia Severity Scale  DESIRAE Total Score: 9  (normal 0-7, mild 8-14, moderate 15-21, severe 22-28)      Social History:  Single    Past Sleep Evaluations:                SLEEP-WAKE SCHEDULE:     Work/School Days: Patient goes to school/work: (Patient-Rptd) No   Usually gets into bed at (Patient-Rptd) 10pm  Takes patient about (Patient-Rptd) 20 minutes to fall asleep  Has trouble falling asleep (Patient-Rptd) 1-2 nights per week  Wakes up in the middle of the night (Patient-Rptd) 1-2 times.  Wakes up due to (Patient-Rptd) Snorting self awake;Use the bathroom;Anxiety  She has trouble falling back asleep (Patient-Rptd) 1-2 times a week.   It usually takes (Patient-Rptd) 10 minutes to get back to sleep  Patient is usually up at  (Patient-Rptd) 5:45-6  Uses alarm: (Patient-Rptd) No    Weekends/Non-work Days/All Other Days:  Usually gets into bed at (Patient-Rptd) 9:30-10   Takes patient about (Patient-Rptd) 20 minutes or so to fall asleep  Patient is usually up at (Patient-Rptd) 5:45-6  Uses alarm: (Patient-Rptd) No    Sleep Need  Patient gets  (Patient-Rptd) 7-8 hrs sleep on average   Patient thinks she needs about (Patient-Rptd) 8 hrs maybe more sleep    Marilee Lindsay prefers to sleep in this position(s): (Patient-Rptd) Side   Patient states they do the following activities in bed: (Patient-Rptd) Read;Eat;Watch TV;Use phone, computer, or tablet    Naps  Patient takes a purposeful nap (Patient-Rptd) Rarely times a week and naps are usually (Patient-Rptd) 30 minutes in duration  She feels better after a nap:    She dozes off unintentionally (Patient-Rptd) 0 days per week  Patient has had a driving accident or near-miss due to sleepiness/drowsiness: (Patient-Rptd) No      SLEEP DISRUPTIONS:    Breathing/Snoring  Patient snores:(Patient-Rptd) Yes  Other people complain about her snoring: (Patient-Rptd) Yes  Patient has been told she stops breathing in her sleep:(Patient-Rptd) Yes  She has issues with the following: (Patient-Rptd) Morning headaches;Morning mouth dryness.    Movement:  Patient gets pain, discomfort, with an urge to move:  (Patient-Rptd) Yes restless legs symptoms  It happens when she is resting:  (Patient-Rptd) No  It happens more at night:  (Patient-Rptd) Yes  Patient has been told she kicks her legs at night:  (Patient-Rptd) No     Behaviors in Sleep:  Marilee Lindsay has experienced the following behaviors while sleeping: (Patient-Rptd) Teeth grinding  She has experienced sudden muscle weakness during the day: (Patient-Rptd) No  Pt denies bruxism, sleep talking, sleep walking, and dream enactment behavior. Pt denies sleep paralysis, hypnagogue and cataplexy.       Is there anything else you would like your sleep  provider to know: (Patient-Rptd) Sleeping better now with new night meds. Also I use a sunrise alarm clock in winter for natural wake up but don t in summer with early aunrose      CAFFEINE AND OTHER SUBSTANCES:    Patient consumes caffeinated beverages per day:  (Patient-Rptd) 4  Last caffeine use is usually: (Patient-Rptd) 6pm depending on evening activities  List of any prescribed or over the counter stimulants that patient takes: (Patient-Rptd) None  List of any prescribed or over the counter sleep medication patient takes: (Patient-Rptd) Melatonin, hydroxizine  List of previous sleep medications that patient has tried: (Patient-Rptd) None other  Patient drinks alcohol to help them sleep: (Patient-Rptd) No  Patient drinks alcohol near bedtime: (Patient-Rptd) No    Family History:  Patient has a family member been diagnosed with a sleep disorder: (Patient-Rptd) Yes  (Patient-Rptd) Mom         SCALES:    EPWORTH SLEEPINESS SCALE         6/9/2025     4:22 PM    [place] Sleepiness Scale ( CHACE Gamble  [id number]<br>ESS - [place]/English - Final version - 21 Nov 07 - [facility] [place].)   Sitting and reading Slight chance of dozing   Watching TV Moderate chance of dozing   Sitting, inactive in a public place (e.g. a theatre or a meeting) Would never doze   As a passenger in a car for an hour without a break Would never doze   Lying down to rest in the afternoon when circumstances permit Slight chance of dozing   Sitting and talking to someone Would never doze   Sitting quietly after a lunch without alcohol Would never doze   In a car, while stopped for a few minutes in traffic Would never doze   [place] Score (MC) 4   [place] Score (Sleep) 4        Patient-reported         INSOMNIA SEVERITY INDEX (DESIRAE)          6/9/2025     4:13 PM   Insomnia Severity Index (DESIRAE)   Difficulty falling asleep 1   Difficulty staying asleep 2   Problems waking up too early 1   How SATISFIED/DISSATISFIED are you with your CURRENT sleep  "pattern? 2   How NOTICEABLE to others do you think your sleep problem is in terms of impairing the quality of your life? 1   How WORRIED/DISTRESSED are you about your current sleep problem? 1   To what extent do you consider your sleep problem to INTERFERE with your daily functioning (e.g. daytime fatigue, mood, ability to function at work/daily chores, concentration, memory, mood, etc.) CURRENTLY? 1   DESIRAE Total Score 9        Patient-reported       Guidelines for Scoring/Interpretation:  Total score categories:  0-7 = No clinically significant insomnia   8-14 = Subthreshold insomnia   15-21 = Clinical insomnia (moderate severity)  22-28 = Clinical insomnia (severe)  Used via courtesy of www.SimpliVityth.va.gov with permission from Jabier Lloyd PhD., [facility]      STOP BANG           6/10/2025     8:56 AM   STOP BANG Questionnaire (  2008, the American Society of Anesthesiologists, Inc. [employer]   BMI Clinic: 23.05         GAD7         No data to display                  CAGE-AID         No data to display                CAGE-AID reprinted with permission from the [place] Medical Journal, EDEL Stanton and SONIA Phan, \"Conjoint screening questionnaires for alcohol and drug abuse\" [place] Medical Journal 94: 135-140, 1995.      PATIENT HEALTH QUESTIONNAIRE-9 (PHQ - 9)         No data to display                Developed by Imelda Reyez, Shantal Kaba, Hasmukh Fischer and colleagues, with an educational geeta from [employer] No permission required to reproduce, translate, display or distribute.        Allergies:    No Known Allergies    Medications:    Current Outpatient Medications   Medication Sig Dispense Refill    hydrOXYzine HCl (ATARAX) 25 MG tablet       clobetasol (TEMOVATE) 0.05 % external ointment Apply topically 2 times daily. To rash on hands when flared only 60 g 1    gabapentin (NEURONTIN) 400 MG capsule TAKE 1 CAPSULE(400 MG) BY MOUTH THREE TIMES DAILY " 270 capsule 2    gabapentin (NEURONTIN) 600 MG tablet Take 1 tablet (600 mg) by mouth 3 times daily. 270 tablet 2    ibuprofen (ADVIL/MOTRIN) 200 MG capsule Take 400 mg by mouth every 8 hours as needed      Magnesium Oxide 250 MG tablet Take 500 mg by mouth as needed.      melatonin 5 MG CAPS Take 10 mg by mouth      MULTIPLE VITAMIN PO Take 1 capsule by mouth daily      valACYclovir (VALTREX) 1000 mg tablet Take 2 tablets at first sign of cold sore, then take 2 tablets 12 hours later. Repeat with outbreaks 12 tablet 3       Problem List:  Patient Active Problem List    Diagnosis Date Noted    Psoriasis 01/29/2024     Priority: Medium    Routine general medical examination at a health care facility 01/29/2024     Priority: Medium    Tobacco use disorder 03/07/2018     Priority: Medium     Formatting of this note might be different from the original.   Daily use. No intention to quit at this time. Prior quit attempts: yes. Prior treatment: Chantix without psychosocial support, not successful. Intention to quit in the next 1-2 years.      Adjustment disorder with mixed anxiety and depressed mood 03/05/2018     Priority: Medium    Chronic tension-type headache, not intractable 02/07/2018     Priority: Medium    History of basal cell cancer 02/07/2018     Priority: Medium     Formatting of this note might be different from the original.   anterior chest. 1998      Alcohol use disorder, severe, in early remission (H) 05/11/2016     Priority: Medium     >>OVERVIEW FOR ALCOHOLISM (H) WRITTEN ON 1/29/2024 12:56 PM BY MOHIT BLACKWOOD APRN CNP    Formatting of this note might be different from the original.   Overview:     Status-post Inpatient 28-day rehab 3/20/16 - April 2016          Past Medical/Surgical History:  Past Medical History:   Diagnosis Date    Basal cell carcinoma      Past Surgical History:   Procedure Laterality Date    COSMETIC SURGERY  2009    brast augmentation :(    GYN SURGERY  2012    removal of  fallopian tubes = no cancer found       Social History:  Social History     Socioeconomic History    Marital status: Single     Spouse name: Not on file    Number of children: Not on file    Years of education: Not on file    Highest education level: Not on file   Occupational History    Not on file   Tobacco Use    Smoking status: Every Day     Current packs/day: 1.00     Average packs/day: 1 pack/day for 40.0 years (40.0 ttl pk-yrs)     Types: Cigarettes    Smokeless tobacco: Never   Vaping Use    Vaping status: Some Days    Substances: Nicotine   Substance and Sexual Activity    Alcohol use: Not Currently    Drug use: Not Currently     Types: Cocaine, Marijuana, Amphetamines, Benzodiazepines, Codeine, Hashish, LSD, MDMA (Ecstasy), Mescaline, Opium, Psilocybin     Comment: experimented but never used any for a long stretch 1979 - 19    Sexual activity: Not Currently     Partners: Male     Birth control/protection: None     Comment: I am perimenopausal   Other Topics Concern    Parent/sibling w/ CABG, MI or angioplasty before 65F 55M? No   Social History Narrative    Not on file     Social Drivers of Health     Financial Resource Strain: Medium Risk (2/1/2025)    [facility]    Financial Resource Strain     Difficulty of Paying Living Expenses: 2     Difficulty of Paying Living Expenses: 1   Food Insecurity: Low Risk  (1/26/2025)    Food Insecurity     Within the past 12 months, did you worry that your food would run out before you got money to buy more?: No     Within the past 12 months, did the food you bought just not last and you didn t have money to get more?: No   Transportation Needs: Low Risk  (1/26/2025)    Transportation Needs     Within the past 12 months, has lack of transportation kept you from medical appointments, getting your medicines, non-medical meetings or appointments, work, or from getting things that you need?: No   Physical Activity:  Insufficiently Active (1/26/2025)    Exercise Vital Sign     Days of Exercise per Week: 3 days     Minutes of Exercise per Session: 40 min   Stress: No Stress Concern Present (1/26/2025)    [place] [employer] - Occupational Stress Questionnaire     Feeling of Stress : Only a little   Social Connections: Unknown (1/26/2025)    Social Connection and Isolation Panel [NHANES]     Frequency of Communication with Friends and Family: Not on file     Frequency of Social Gatherings with Friends and Family: Three times a week     Attends Yazdanism Services: Not on file     Active Member of Clubs or Organizations: Not on file     Attends Club or Organization Meetings: Not on file     Marital Status: Not on file   Recent Concern: Social Connections - Socially Isolated (12/9/2024)    Received from [facility]    Social Connections     Do you often feel lonely or isolated from those around you?: 4   Interpersonal Safety: Low Risk  (1/31/2025)    Interpersonal Safety     Do you feel physically and emotionally safe where you currently live?: Yes     Within the past 12 months, have you been hit, slapped, kicked or otherwise physically hurt by someone?: No     Within the past 12 months, have you been humiliated or emotionally abused in other ways by your partner or ex-partner?: No   Housing Stability: Low Risk  (1/26/2025)    Housing Stability     Do you have housing? : Yes     Are you worried about losing your housing?: No   Recent Concern: Housing Stability - High Risk (12/9/2024)    Received from [facility]    Housing Stability     What is your housing situation today?: 3       Family History:  Family History   Problem Relation Age of Onset    Skin Cancer Mother     Alzheimer Disease Father     Heart Disease Father     Alzheimer Disease Sister 59    Diabetes Maternal Grandmother     Heart Failure Maternal Grandmother     Breast Cancer Paternal  "Grandmother 70 - 79    Colon Cancer No family hx of     Thyroid Cancer No family hx of        Review of Systems:  A complete review of systems reviewed by me is negative with the exeption of what has been mentioned in the history of present illness.  In the last TWO WEEKS have you experienced any of the following symptoms?  Fevers: (Patient-Rptd) No  Night Sweats: (Patient-Rptd) No  Weight Gain: (Patient-Rptd) No  Pain at Night: (Patient-Rptd) Yes  Double Vision: (Patient-Rptd) No  Changes in Vision: (Patient-Rptd) No  Difficulty Breathing through Nose: (Patient-Rptd) No  Sore Throat in Morning: (Patient-Rptd) No  Dry Mouth in the Morning: (Patient-Rptd) Yes  Shortness of Breath Lying Flat: (Patient-Rptd) No  Shortness of Breath With Activity: (Patient-Rptd) Yes  Awakening with Shortness of Breath: (Patient-Rptd) No  Increased Cough: (Patient-Rptd) No  Heart Racing at Night: (Patient-Rptd) No  Swelling in Feet or Legs: (Patient-Rptd) No  Diarrhea at Night: (Patient-Rptd) No  Heartburn at Night: (Patient-Rptd) No  Urinating More than Once at Night: (Patient-Rptd) No  Losing Control of Urine at Night: (Patient-Rptd) No  Joint Pains at Night: (Patient-Rptd) No  Headaches in Morning: (Patient-Rptd) Yes  Weakness in Arms or Legs: (Patient-Rptd) No  Depressed Mood: (Patient-Rptd) No  Anxiety: (Patient-Rptd) Yes     Physical Examination:  Vitals: Ht 1.549 m (5' 1\")   Wt 55.3 kg (122 lb)   BMI 23.05 kg/m    BMI= Body mass index is 23.05 kg/m .         Physical Exam  Vitals and nursing note reviewed.   Constitutional:       General: She is awake. She is not in acute distress.     Appearance: Normal appearance. She is well-developed, well-groomed and normal weight. She is not ill-appearing, toxic-appearing or diaphoretic.   HENT:      Head: Normocephalic and atraumatic.      Right Ear: External ear normal.      Left Ear: External ear normal.      Nose: Nose normal.      Mouth/Throat:      Mouth: Mucous membranes are moist. " "     Pharynx: Oropharynx is clear.   Eyes:      Conjunctiva/sclera: Conjunctivae normal.   Pulmonary:      Effort: Pulmonary effort is normal.   Musculoskeletal:      Cervical back: Normal range of motion and neck supple.   Neurological:      General: No focal deficit present.      Mental Status: She is alert and oriented to person, place, and time.   Psychiatric:         Mood and Affect: Mood normal.         Behavior: Behavior normal. Behavior is cooperative.         Thought Content: Thought content normal.         Judgment: Judgment normal.            Physical examination is limited by the nature of a virtual visit      All Labs Personally Reviewed                     Data: All pertinent previous laboratory data reviewed     Recent Labs   Lab Test 01/29/24  1356      POTASSIUM 4.4   CHLORIDE 102   CO2 27   ANIONGAP 10   GLC 85   BUN 10.0   CR 0.54   CHILO 9.7       Recent Labs   Lab Test 01/29/24  1356   WBC 7.3   RBC 4.37   HGB 13.7   HCT 41.1   MCV 94   MCH 31.4   MCHC 33.3   RDW 13.3          Recent Labs   Lab Test 01/29/24  1356   PROTTOTAL 7.2   ALBUMIN 4.8   BILITOTAL <0.2   ALKPHOS 88   AST 20   ALT 18       No results found for: \"TSH\"    No results found for: \"UAMP\", \"UBARB\", \"BENZODIAZEUR\", \"UCANN\", \"UCOC\", \"OPIT\", \"UPCP\"    No results found for: \"IRONSAT\", \"SL63611\", \"ARMAND\"    No results found for: \"PH\", \"PHARTERIAL\", \"PO2\", \"IM4TAUPRIWM\", \"SAT\", \"PCO2\", \"HCO3\", \"BASEEXCESS\", \"WILD\", \"BEB\"    @LABRCNTIPR(phv:4,pco2v:4,po2v:4,hco3v:4,velma:4,o2per:4)@    Echocardiology: No results found for this or any previous visit (from the past 4320 hours).        Chest x-ray: No results found for this or any previous visit from the past 365 days.      Chest CT: No results found for this or any previous visit from the past 365 days.      PFT: Most Recent [facility] Pulmonary Function Testing        Annette Mariano, OVIDIO CNP 6/9/2025   Sleep Medicine          "

## 2025-07-03 ENCOUNTER — TELEPHONE (OUTPATIENT)
Dept: FAMILY MEDICINE | Facility: CLINIC | Age: 60
End: 2025-07-03
Payer: COMMERCIAL

## 2025-07-03 NOTE — TELEPHONE ENCOUNTER
Left message to call back for: Marilee  Information to relay to patient: Please notify patient that we [facility] stating that they have not been able to reach her for her follow imaging. See last Mammo report in chart.    Thank you.  Jacqueline ALFARO CMA

## 2025-08-30 ENCOUNTER — MYC MEDICAL ADVICE (OUTPATIENT)
Dept: FAMILY MEDICINE | Facility: CLINIC | Age: 60
End: 2025-08-30
Payer: COMMERCIAL

## 2025-08-30 ENCOUNTER — MYC REFILL (OUTPATIENT)
Dept: FAMILY MEDICINE | Facility: CLINIC | Age: 60
End: 2025-08-30
Payer: COMMERCIAL

## 2025-08-30 RX ORDER — HYDROXYZINE HYDROCHLORIDE 25 MG/1
TABLET, FILM COATED ORAL
Status: CANCELLED | OUTPATIENT
Start: 2025-08-30